# Patient Record
Sex: MALE | Race: WHITE | Employment: UNEMPLOYED | URBAN - METROPOLITAN AREA
[De-identification: names, ages, dates, MRNs, and addresses within clinical notes are randomized per-mention and may not be internally consistent; named-entity substitution may affect disease eponyms.]

---

## 2017-01-02 ENCOUNTER — APPOINTMENT (OUTPATIENT)
Dept: CARDIAC REHAB | Age: 62
End: 2017-01-02
Attending: INTERNAL MEDICINE
Payer: COMMERCIAL

## 2017-01-04 ENCOUNTER — HOSPITAL ENCOUNTER (OUTPATIENT)
Dept: CARDIAC REHAB | Age: 62
Discharge: HOME | End: 2017-01-04
Attending: INTERNAL MEDICINE
Payer: COMMERCIAL

## 2017-01-04 RX ORDER — METFORMIN HYDROCHLORIDE 1000 MG/1
1000 TABLET ORAL 2 TIMES DAILY WITH MEALS
Qty: 60 TAB | Refills: 2 | Status: SHIPPED | OUTPATIENT
Start: 2017-01-04 | End: 2017-05-17 | Stop reason: SDUPTHER

## 2017-01-06 ENCOUNTER — HOSPITAL ENCOUNTER (OUTPATIENT)
Dept: CARDIAC REHAB | Age: 62
Discharge: HOME | End: 2017-01-06
Attending: INTERNAL MEDICINE
Payer: COMMERCIAL

## 2017-01-06 VITALS — BODY MASS INDEX: 26.31 KG/M2 | WEIGHT: 163 LBS

## 2017-01-06 PROCEDURE — 93798 PHYS/QHP OP CAR RHAB W/ECG: CPT

## 2017-01-06 NOTE — CARDIO/PULMONARY
He reported having some 4/10 intermittent lower epigastic area pain. He has had this before and has seen physician about it.

## 2017-01-09 ENCOUNTER — HOSPITAL ENCOUNTER (OUTPATIENT)
Dept: CARDIAC REHAB | Age: 62
Discharge: HOME | End: 2017-01-09
Attending: INTERNAL MEDICINE
Payer: COMMERCIAL

## 2017-01-11 ENCOUNTER — HOSPITAL ENCOUNTER (OUTPATIENT)
Dept: CARDIAC REHAB | Age: 62
Discharge: HOME | End: 2017-01-11
Attending: INTERNAL MEDICINE
Payer: COMMERCIAL

## 2017-01-11 VITALS — WEIGHT: 161 LBS | BODY MASS INDEX: 25.99 KG/M2

## 2017-01-11 PROCEDURE — 93798 PHYS/QHP OP CAR RHAB W/ECG: CPT

## 2017-01-13 ENCOUNTER — HOSPITAL ENCOUNTER (OUTPATIENT)
Dept: CARDIAC REHAB | Age: 62
Discharge: HOME | End: 2017-01-13
Attending: INTERNAL MEDICINE
Payer: COMMERCIAL

## 2017-01-13 VITALS — WEIGHT: 162 LBS | BODY MASS INDEX: 26.15 KG/M2

## 2017-01-13 PROCEDURE — 93798 PHYS/QHP OP CAR RHAB W/ECG: CPT

## 2017-01-16 ENCOUNTER — HOSPITAL ENCOUNTER (OUTPATIENT)
Dept: CARDIAC REHAB | Age: 62
Discharge: HOME | End: 2017-01-16
Attending: INTERNAL MEDICINE
Payer: COMMERCIAL

## 2017-01-16 VITALS — BODY MASS INDEX: 25.99 KG/M2 | WEIGHT: 161 LBS

## 2017-01-16 PROCEDURE — 93798 PHYS/QHP OP CAR RHAB W/ECG: CPT

## 2017-01-18 ENCOUNTER — HOSPITAL ENCOUNTER (OUTPATIENT)
Dept: CARDIAC REHAB | Age: 62
Discharge: HOME | End: 2017-01-18
Attending: INTERNAL MEDICINE
Payer: COMMERCIAL

## 2017-01-18 VITALS — WEIGHT: 161 LBS | BODY MASS INDEX: 25.99 KG/M2

## 2017-01-18 PROCEDURE — 93798 PHYS/QHP OP CAR RHAB W/ECG: CPT

## 2017-01-18 NOTE — CARDIO/PULMONARY
CP REHAB NOTE    Patient arrived for session 15/36. /72  HR 74      Patient is walking 20-30 minutes on days he is not at rehab. He eats 3 fruits and vegetables per day. Encouraged patient to try and get 5 servings per day. Patient is still smoking. Discussed smoking cessation with patient and encouraged him to have a discussion with his doctor about medications to help.

## 2017-01-20 ENCOUNTER — HOSPITAL ENCOUNTER (OUTPATIENT)
Dept: CARDIAC REHAB | Age: 62
Discharge: HOME | End: 2017-01-20
Attending: INTERNAL MEDICINE
Payer: COMMERCIAL

## 2017-01-20 VITALS — WEIGHT: 158 LBS | BODY MASS INDEX: 25.5 KG/M2

## 2017-01-20 PROCEDURE — 93798 PHYS/QHP OP CAR RHAB W/ECG: CPT

## 2017-01-23 ENCOUNTER — HOSPITAL ENCOUNTER (OUTPATIENT)
Dept: CARDIAC REHAB | Age: 62
Discharge: HOME | End: 2017-01-23
Attending: INTERNAL MEDICINE
Payer: COMMERCIAL

## 2017-01-23 VITALS — WEIGHT: 157 LBS | BODY MASS INDEX: 25.34 KG/M2

## 2017-01-23 PROCEDURE — 93798 PHYS/QHP OP CAR RHAB W/ECG: CPT

## 2017-01-23 NOTE — CARDIO/PULMONARY
No med changes. Denies symptoms. Walks for exercise on days he is not in rehab for up to 30 minutes. Is independent with his exercise regimen in Cardiac Rehab. Down to half a pack a day. Has not set a quit date. Has used nicotine patch in the past but it made him \"sick. \"  Encouragement provided.

## 2017-01-25 ENCOUNTER — OFFICE VISIT (OUTPATIENT)
Dept: CARDIOLOGY CLINIC | Age: 62
End: 2017-01-25

## 2017-01-25 ENCOUNTER — HOSPITAL ENCOUNTER (OUTPATIENT)
Dept: CARDIAC REHAB | Age: 62
Discharge: HOME | End: 2017-01-25
Attending: INTERNAL MEDICINE
Payer: COMMERCIAL

## 2017-01-25 ENCOUNTER — CLINICAL SUPPORT (OUTPATIENT)
Dept: CARDIOLOGY CLINIC | Age: 62
End: 2017-01-25

## 2017-01-25 VITALS
SYSTOLIC BLOOD PRESSURE: 100 MMHG | HEIGHT: 66 IN | DIASTOLIC BLOOD PRESSURE: 56 MMHG | RESPIRATION RATE: 18 BRPM | BODY MASS INDEX: 25.23 KG/M2 | OXYGEN SATURATION: 96 % | HEART RATE: 60 BPM | WEIGHT: 157 LBS

## 2017-01-25 DIAGNOSIS — I25.5 ISCHEMIC CARDIOMYOPATHY: ICD-10-CM

## 2017-01-25 DIAGNOSIS — I42.9 CARDIOMYOPATHY (HCC): ICD-10-CM

## 2017-01-25 DIAGNOSIS — F17.200 SMOKER: ICD-10-CM

## 2017-01-25 DIAGNOSIS — I50.22 CHRONIC SYSTOLIC CONGESTIVE HEART FAILURE (HCC): ICD-10-CM

## 2017-01-25 DIAGNOSIS — Z95.5 S/P CORONARY ARTERY STENT PLACEMENT: ICD-10-CM

## 2017-01-25 DIAGNOSIS — E78.2 MIXED HYPERLIPIDEMIA: ICD-10-CM

## 2017-01-25 DIAGNOSIS — E78.2 MIXED HYPERLIPIDEMIA: Primary | ICD-10-CM

## 2017-01-25 PROCEDURE — 93798 PHYS/QHP OP CAR RHAB W/ECG: CPT

## 2017-01-25 NOTE — PROGRESS NOTES
PATIENT'S ID VERIFIED BY TWO IDENTIFIERS. ECHOCARDIOGRAM PROCEDURE EXPLAINED TO PATIENT. PATIENT VERBALIZED UNDERSTANDING OF INSTRUCTIONS.

## 2017-01-25 NOTE — MR AVS SNAPSHOT
Visit Information Date & Time Provider Department Dept. Phone Encounter #  
 1/25/2017 10:45 AM Eduar Elaine, 1024 [facility]  Your Appointments 1/26/2017  9:30 AM  
New Patient with Hardik Mills MD  
1400 [facility]) Appt Note: Per Dr Jacky Escobar ICD $0CP REM  
 [address]  
[phone] [address]  
  
    
 4/26/2017 10:45 AM  
3 MONTH with Eduar Elaine MD  
[facility] Appt Note: Per Dr Jacky Escobar $0CP REM  
 [address]  
[phone] [address] Upcoming Health Maintenance Date Due Hepatitis C Screening 1955 FOOT EXAM Q1 3/30/1963 EYE EXAM RETINAL OR DILATED Q1 3/30/1963 Pneumococcal 19-64 Medium Risk (1 of 1 - PPSV23) 3/30/1972 FOBT Q 1 YEAR AGE 50-75 3/30/2003 ZOSTER VACCINE AGE 60> 3/30/2013 HEMOGLOBIN A1C Q6M 12/20/2016 LIPID PANEL Q1 6/20/2017 MICROALBUMIN Q1 8/16/2017 DTaP/Tdap/Td series (2 - Td) 9/11/2023 Allergies as of 1/25/2017  Review Complete On: 1/25/2017 By: Ten Skinner LPN Severity Noted Reaction Type Reactions Penicillins Low 05/23/2016   Not Verified Other (comments) Doesn't remember reaction Current Immunizations  Reviewed on 11/29/2016 Name Date Influenza Vaccine 10/27/2016 Influenza Vaccine (Quad) PF 10/30/2016  9:39 AM  
 Pneumococcal Conjugate (PCV-13) 6/20/2016 Not reviewed this visit You Were Diagnosed With   
  
 Codes Comments Mixed hyperlipidemia    -  Primary ICD-10-CM: K89.0 ICD-9-CM: 272.2 Vitals BP Pulse Resp Height(growth percentile) Weight(growth percentile) SpO2  
 100/56 (BP 1 Location: Right arm, BP Patient Position: Sitting) 60 18 5' 6\" (1.676 m) 157 lb (71.2 kg) 96% BMI Smoking Status 25.34 kg/m2 Current Every Day Smoker Vitals History BMI and BSA Data Body Mass Index Body Surface Area  
 25.34 kg/m 2 1.82 m 2 Preferred Pharmacy Pharmacy Name Phone [facility], [address] [place] [address] [phone] Your Updated Medication List  
  
   
This list is accurate as of: 1/25/17 12:03 PM.  Always use your most recent med list.  
  
  
  
  
 aspirin delayed-release 81 mg tablet Take 1 Tab by mouth daily. atorvastatin 20 mg tablet Commonly known as:  LIPITOR Take 1 Tab by mouth daily. Blood-Glucose Meter monitoring kit Check every day. Brand per patient's choice  
  
 carvedilol 6.25 mg tablet Commonly known as:  Josue Marshal Take 1 Tab by mouth two (2) times daily (with meals). glucose blood VI test strips strip Commonly known as:  ASCENSIA AUTODISC VI, ONE TOUCH ULTRA TEST VI Check every day. Brand per patient's choice * Lancets Misc Check QD  
  
 * ONETOUCH DELICA LANCETS 30 gauge Misc Generic drug:  lancets USE DAILY TO CHECK BLOOD SUGAR  
  
 levothyroxine 88 mcg tablet Commonly known as:  SYNTHROID Take 1 Tab by mouth Daily (before breakfast). lisinopril 2.5 mg tablet Commonly known as:  Crystal Reasons Take 1 Tab by mouth daily. metFORMIN 1,000 mg tablet Commonly known as:  GLUCOPHAGE Take 1 Tab by mouth two (2) times daily (with meals). pantoprazole 40 mg tablet Commonly known as:  PROTONIX Take 1 Tab by mouth two (2) times a day. ticagrelor 90 mg tablet Commonly known as:  [employer] Take 1 Tab by mouth every twelve (12) hours every twelve (12) hours. * Notice: This list has 2 medication(s) that are the same as other medications prescribed for you. Read the directions carefully, and ask your doctor or other care provider to review them with you. We Performed the Following AMB POC EKG ROUTINE W/ 12 LEADS, INTER & REP [28977 CPT(R)] To-Do List   
 01/25/2017 1:00 PM  
  Appointment with MRM CARDIOPULM PHASE II at [facility] ([phone])  
  
 01/27/2017 1:00 PM  
  Appointment with MRM CARDIOPULM PHASE II at [facility] ([phone])  
  
 01/30/2017 1:00 PM  
  Appointment with MRM CARDIOPULM PHASE II at [facility] ([phone])  
  
 02/01/2017 1:00 PM  
  Appointment with MRM CARDIOPULM PHASE II at [facility] ([phone])  
  
 02/03/2017 1:00 PM  
  Appointment with MRM CARDIOPULM PHASE II at [facility] ([phone])  
  
 02/06/2017 1:00 PM  
  Appointment with MRM CARDIOPULM PHASE II at [facility] ([phone])  
  
 02/08/2017 1:00 PM  
  Appointment with MRM CARDIOPULM PHASE II at [facility] ([phone])  
  
 02/10/2017 1:00 PM  
  Appointment with MRM CARDIOPULM PHASE II at [facility] ([phone])  
  
 02/13/2017 1:00 PM  
  Appointment with MRM CARDIOPULM PHASE II at [facility] ([phone])  
  
 02/15/2017 1:00 PM  
  Appointment with MRM CARDIOPULM PHASE II at [facility] ([phone])  
  
 02/17/2017 1:00 PM  
  Appointment with MRM CARDIOPULM PHASE II at [facility] ([phone])  
  
 02/20/2017 1:00 PM  
  Appointment with MRM CARDIOPULM PHASE II at [facility] ([phone])  
  
 02/22/2017 1:00 PM  
  Appointment with MRM CARDIOPULM PHASE II at [facility] ([phone])  
  
 02/24/2017 1:00 PM  
  Appointment with MRM CARDIOPULM PHASE II at [facility] ([phone])  
  
 02/27/2017 1:00 PM  
  Appointment with MRM CARDIOPULM PHASE II at [facility] ([phone])  
  
 03/01/2017 1:00 PM  
  Appointment with MRM CARDIOPULM PHASE II at [facility] ([phone]) [facility] SERVICES! Dear Get Jarvis: Thank you for requesting a [employer] account.   Our records indicate that you already have an active [employer] account. You can access your account anytime at [email or web address] Did you know that you can access your hospital and ER discharge instructions at any time in [employer]? You can also review all of your test results from your hospital stay or ER visit. Additional Information If you have questions, please visit the Frequently Asked Questions section of the [employer] website at [email or web address] Remember, [employer] is NOT to be used for urgent needs. For medical emergencies, dial 911. Now available from your iPhone and Android! Please provide this summary of care documentation to your next provider. Your primary care clinician is listed as Anna Barakat. If you have any questions after today's visit, please call [phone].

## 2017-01-25 NOTE — PROGRESS NOTES
Serena Elias MD          NAME:  Kedar Jacobsen   :   1955   MRN:   [id number]   PCP:  Noé Naidu MD           Subjective: The patient is a 59y.o. year old male  who returns for a routine follow-up. Since the last visit, patient reports no change in exercise tolerance, chest pain, edema, medication intolerance, palpitations, shortness of breath, PND/orthopnea wheezing, sputum, syncope, dizziness or light headedness. Doing well. Past Medical History   Diagnosis Date    Acquired hypothyroidism 2016    Acute kidney injury ([facility] 10/26/2016    Adhesive capsulitis of right shoulder 2016    Chronic systolic congestive heart failure ([facility] 2016    Chronic systolic congestive heart failure ([facility] 2016    Diabetes type 2, uncontrolled ([facility] 2016    Esophageal ulceration 10/26/2016     10/25/16 2 hemoclips placed    Gastroesophageal reflux disease without esophagitis 2016    GIB (gastrointestinal bleeding) 10/26/2016    Mixed hyperlipidemia 2016    S/P coronary artery stent placement 10/25/2016     10/24/16 PCI/EDUARDO x6 to prox LCx, [id number] RCA        ICD-10-CM ICD-9-CM    1. Mixed hyperlipidemia E78.2 272.2 AMB POC EKG ROUTINE W/ 12 LEADS, INTER & REP   2. Chronic systolic congestive heart failure (HCC) I50.22 428.22      428.0    3. Cardiomyopathy ([facility] I42.9 425.4    4. Ischemic cardiomyopathy I25.5 414.8    5.  S/P coronary artery stent placement Z95.5 V45.82       Social History   Substance Use Topics    Smoking status: Current Every Day Smoker     Packs/day: 1.00     Years: 45.00    Smokeless tobacco: Never Used      Comment: down to 1/2 pack daily 16    Alcohol use No      Family History   Problem Relation Age of Onset    Heart Disease Father      cabg x2, valve repair    Hypertension Father     Diabetes Brother     Diabetes Maternal Grandmother         Review of Systems  Cardiovascular: Negative except as noted in HPI      Objective: Vitals:    01/25/17 1106 01/25/17 1113   BP: 100/50 100/56   Pulse: 60    Resp: 18    SpO2: 96%    Weight: 157 lb (71.2 kg)    Height: 5' 6\" (1.676 m)     Body mass index is 25.34 kg/(m^2). General PE  Mental Status - Alert. General Appearance - Not in acute distress. Chest and Lung Exam   Inspection: Accessory muscles - No use of accessory muscles in breathing. Auscultation:   Breath sounds: - Normal.    Cardiovascular   Inspection: Jugular vein - Bilateral - Inspection Normal.  Palpation/Percussion:   Apical Impulse: - Normal.  Auscultation: Rhythm - Regular. Heart Sounds - S1 WNL and S2 WNL. No S3 or S4. Murmurs & Other Heart Sounds: Auscultation of the heart reveals - No Murmurs. Peripheral Vascular   Upper Extremity: Inspection - Bilateral - No Cyanotic nailbeds or Digital clubbing. Lower Extremity:   Palpation: Edema - Bilateral - No edema. Data Review:     EKG -  EKG: unchanged from previous tracings, normal sinus rhythm, nonspecific ST and T waves changes. LABS- @brieflabs@      Allergies reviewed  Allergies   Allergen Reactions    Penicillins Other (comments)     Doesn't remember reaction       Medications reviewed  Current Outpatient Prescriptions   Medication Sig    metFORMIN (GLUCOPHAGE) 1,000 mg tablet Take 1 Tab by mouth two (2) times daily (with meals).  lisinopril (PRINIVIL, ZESTRIL) 2.5 mg tablet Take 1 Tab by mouth daily.  carvedilol (COREG) 6.25 mg tablet Take 1 Tab by mouth two (2) times daily (with meals).  ticagrelor (BRILINTA) 90 mg tablet Take 1 Tab by mouth every twelve (12) hours every twelve (12) hours.  pantoprazole (PROTONIX) 40 mg tablet Take 1 Tab by mouth two (2) times a day.  levothyroxine (SYNTHROID) 88 mcg tablet Take 1 Tab by mouth Daily (before breakfast).  atorvastatin (LIPITOR) 20 mg tablet Take 1 Tab by mouth daily.     ONETOUCH DELICA LANCETS 30 gauge misc USE DAILY TO CHECK BLOOD SUGAR    aspirin delayed-release 81 mg tablet Take 1 Tab by mouth daily.  Blood-Glucose Meter monitoring kit Check every day. Brand per patient's choice    glucose blood VI test strips (ASCENSIA AUTODISC VI, ONE TOUCH ULTRA TEST VI) strip Check every day. Brand per patient's choice    Lancets misc Check QD     No current facility-administered medications for this visit. Assessment:       ICD-10-CM ICD-9-CM    1. Mixed hyperlipidemia E78.2 272.2 AMB POC EKG ROUTINE W/ 12 LEADS, INTER & REP   2. Chronic systolic congestive heart failure (HCC) I50.22 428.22      428.0    3. Cardiomyopathy ([facility] I42.9 425.4    4. Ischemic cardiomyopathy I25.5 414.8    5. S/P coronary artery stent placement Z95.5 V45.82         Orders Placed This Encounter    AMB POC EKG ROUTINE W/ 12 LEADS, INTER & REP     Order Specific Question:   Reason for Exam:     Answer:   routine       Plan:     EF 35% 90 d post multivessel stenting. Rec ICD. Refer to EP.   F/U 3 mo    Julienne Corrigan MD

## 2017-01-25 NOTE — PROGRESS NOTES
Chief Complaint   Patient presents with    Heart Problem     Had echo today. Denied cardiac symptoms.

## 2017-01-26 ENCOUNTER — OFFICE VISIT (OUTPATIENT)
Dept: CARDIOLOGY CLINIC | Age: 62
End: 2017-01-26

## 2017-01-26 VITALS
DIASTOLIC BLOOD PRESSURE: 60 MMHG | OXYGEN SATURATION: 98 % | WEIGHT: 159.13 LBS | HEART RATE: 69 BPM | HEIGHT: 66 IN | RESPIRATION RATE: 16 BRPM | SYSTOLIC BLOOD PRESSURE: 118 MMHG | BODY MASS INDEX: 25.57 KG/M2

## 2017-01-26 DIAGNOSIS — E78.2 MIXED HYPERLIPIDEMIA: Primary | ICD-10-CM

## 2017-01-26 DIAGNOSIS — I25.5 ISCHEMIC CARDIOMYOPATHY: Primary | ICD-10-CM

## 2017-01-26 DIAGNOSIS — I25.5 ISCHEMIC CARDIOMYOPATHY: ICD-10-CM

## 2017-01-26 DIAGNOSIS — I50.22 CHRONIC SYSTOLIC CONGESTIVE HEART FAILURE (HCC): ICD-10-CM

## 2017-01-26 NOTE — MR AVS SNAPSHOT
Visit Information Date & Time Provider Department Dept. Phone Encounter #  
 1/26/2017  9:30 AM Stephanie Aguirre, 1024 [facility] [phone] [id number] Your Appointments 4/26/2017 10:45 AM  
3 MONTH with Rohan Serrano MD  
[address] [facility] [address] [facility]) Appt Note: Per Dr Adali hCen $0CP REM  
 [facility]  
[phone] [facility] Upcoming Health Maintenance Date Due Hepatitis C Screening 1955 FOOT EXAM Q1 3/30/1963 EYE EXAM RETINAL OR DILATED Q1 3/30/1963 Pneumococcal 19-64 Medium Risk (1 of 1 - PPSV23) 3/30/1972 FOBT Q 1 YEAR AGE 50-75 3/30/2003 ZOSTER VACCINE AGE 60> 3/30/2013 HEMOGLOBIN A1C Q6M 12/20/2016 LIPID PANEL Q1 6/20/2017 MICROALBUMIN Q1 8/16/2017 DTaP/Tdap/Td series (2 - Td) 9/11/2023 Allergies as of 1/26/2017  Review Complete On: 1/26/2017 By: Anna Marie Gordon LPN Severity Noted Reaction Type Reactions Penicillins Low 05/23/2016   Not Verified Other (comments) Doesn't remember reaction Current Immunizations  Reviewed on 11/29/2016 Name Date Influenza Vaccine 10/27/2016 Influenza Vaccine (Quad) PF 10/30/2016  9:39 AM  
 Pneumococcal Conjugate (PCV-13) 6/20/2016 Not reviewed this visit You Were Diagnosed With   
  
 Codes Comments Mixed hyperlipidemia    -  Primary ICD-10-CM: P59.8 ICD-9-CM: 272.2 Vitals BP Pulse Resp Height(growth percentile) Weight(growth percentile) SpO2  
 118/60 (BP 1 Location: Left arm, BP Patient Position: Sitting) 69 16 5' 6\" (1.676 m) 159 lb 2 oz (72.2 kg) 98% BMI Smoking Status 25.68 kg/m2 Current Every Day Smoker Vitals History BMI and BSA Data Body Mass Index Body Surface Area  
 25.68 kg/m 2 1.83 m 2 Preferred Pharmacy [facility] [address] [place] [address] Lilly Oshea, [address] [place] [address] [phone] Your Updated Medication List  
  
   
This list is accurate as of: 1/26/17 10:12 AM.  Always use your most recent med list.  
  
  
  
  
 aspirin delayed-release 81 mg tablet Take 1 Tab by mouth daily. atorvastatin 20 mg tablet Commonly known as:  LIPITOR Take 1 Tab by mouth daily. Blood-Glucose Meter monitoring kit Check every day. Brand per patient's choice  
  
 carvedilol 6.25 mg tablet Commonly known as:  Marleny Locket Take 1 Tab by mouth two (2) times daily (with meals). glucose blood VI test strips strip Commonly known as:  ASCENSIA AUTODISC VI, ONE TOUCH ULTRA TEST VI Check every day. Brand per patient's choice * Lancets Misc Check QD  
  
 * ONETOUCH DELICA LANCETS 30 gauge Misc Generic drug:  lancets USE DAILY TO CHECK BLOOD SUGAR  
  
 levothyroxine 88 mcg tablet Commonly known as:  SYNTHROID Take 1 Tab by mouth Daily (before breakfast). lisinopril 2.5 mg tablet Commonly known as:  Marnie Trevor Take 1 Tab by mouth daily. metFORMIN 1,000 mg tablet Commonly known as:  GLUCOPHAGE Take 1 Tab by mouth two (2) times daily (with meals). pantoprazole 40 mg tablet Commonly known as:  PROTONIX Take 1 Tab by mouth two (2) times a day. ticagrelor 90 mg tablet Commonly known as:  [employer] Take 1 Tab by mouth every twelve (12) hours every twelve (12) hours. * Notice: This list has 2 medication(s) that are the same as other medications prescribed for you. Read the directions carefully, and ask your doctor or other care provider to review them with you. We Performed the Following AMB POC EKG ROUTINE W/ 12 LEADS, INTER & REP [76241 CPT(R)] To-Do List   
 01/27/2017 1:00 PM  
  Appointment with KOFFI CARDIOSARAH PHASE II at [facility] ([phone])  
  
 01/30/2017 1:00 PM  
 Appointment with MRM CARDIOPULM PHASE II at [facility] ([phone])  
  
 02/01/2017 1:00 PM  
  Appointment with MRM CARDIOPULM PHASE II at [facility] ([phone])  
  
 02/03/2017 1:00 PM  
  Appointment with MRM CARDIOPULM PHASE II at [facility] ([phone])  
  
 02/06/2017 1:00 PM  
  Appointment with MRM CARDIOPULM PHASE II at [facility] ([phone])  
  
 02/08/2017 1:00 PM  
  Appointment with MRM CARDIOPULM PHASE II at [facility] ([phone])  
  
 02/10/2017 1:00 PM  
  Appointment with MRM CARDIOPULM PHASE II at [facility] ([phone])  
  
 02/13/2017 1:00 PM  
  Appointment with MRM CARDIOPULM PHASE II at [facility] ([phone])  
  
 02/15/2017 1:00 PM  
  Appointment with MRM CARDIOPULM PHASE II at [facility] ([phone])  
  
 02/17/2017 1:00 PM  
  Appointment with MRM CARDIOPULM PHASE II at [facility] ([phone])  
  
 02/20/2017 1:00 PM  
  Appointment with MRM CARDIOPULM PHASE II at [facility] ([phone])  
  
 02/22/2017 1:00 PM  
  Appointment with MRM CARDIOPULM PHASE II at [facility] ([phone])  
  
 02/24/2017 1:00 PM  
  Appointment with MRM CARDIOPULM PHASE II at [facility] ([phone])  
  
 02/27/2017 1:00 PM  
  Appointment with MRM CARDIOPULM PHASE II at [facility] ([phone])  
  
 03/01/2017 1:00 PM  
  Appointment with MRM CARDIOPULM PHASE II at [facility] ([phone]) [facility] SERVICES! Dear Megha Dawson: Thank you for requesting a [employer] account. Our records indicate that you already have an active [employer] account. You can access your account anytime at [email or web address] Did you know that you can access your hospital and ER discharge instructions at any time in [employer]? You can also review all of your test results from your hospital stay or ER visit. Additional Information If you have questions, please visit the Frequently Asked Questions section of the [employer] website at [email or web address] Remember, [employer] is NOT to be used for urgent needs. For medical emergencies, dial 911. Now available from your iPhone and Android! Please provide this summary of care documentation to your next provider. Your primary care clinician is listed as Falguni Boland. If you have any questions after today's visit, please call [phone].

## 2017-01-26 NOTE — PROGRESS NOTES
Subjective:      Meg Browning is a 59 y.o. male is here for EP consult. He had a pci last year and repeat echo post 3 months of med rx demonstrated cardiomyopathy. He has sob with exertion.      Patient Active Problem List    Diagnosis Date Noted    Ischemic cardiomyopathy 11/11/2016    Esophageal ulceration 10/26/2016    GIB (gastrointestinal bleeding) 10/26/2016    Acute kidney injury ([address] 10/26/2016    S/P coronary artery stent placement 10/25/2016    Unstable angina ([address] 10/24/2016    Angina, class III ([address] 10/24/2016    Chronic systolic congestive heart failure ([address] 08/30/2016    GUILLEN (dyspnea on exertion) 08/24/2016    Acquired hypothyroidism 07/11/2016    Mixed hyperlipidemia 07/11/2016    Diabetes type 2, uncontrolled ([address] 07/11/2016    Smoker 05/23/2016    Adhesive capsulitis of right shoulder 05/23/2016    Gastroesophageal reflux disease without esophagitis 05/23/2016      Coral Cervantes MD  Past Medical History   Diagnosis Date    Acquired hypothyroidism 7/11/2016    Acute kidney injury ([address] 10/26/2016    Adhesive capsulitis of right shoulder 5/23/2016    Chronic systolic congestive heart failure ([address] 8/30/2016    Chronic systolic congestive heart failure ([address] 8/30/2016    Diabetes type 2, uncontrolled ([address] 7/11/2016    Esophageal ulceration 10/26/2016     10/25/16 2 hemoclips placed    Gastroesophageal reflux disease without esophagitis 5/23/2016    GIB (gastrointestinal bleeding) 10/26/2016    Mixed hyperlipidemia 7/11/2016    S/P coronary artery stent placement 10/25/2016     10/24/16 PCI/EDUARDO x6 to prox LCx, [id number] RCA      Past Surgical History   Procedure Laterality Date    Pr abdomen surgery proc unlisted       hernia repair right    Upper gi endoscopy,ctrl bleed  10/25/2016          Upper gi endoscopy,biopsy  10/25/2016           Allergies   Allergen Reactions    Penicillins Other (comments)     Doesn't remember reaction      Family History Problem Relation Age of Onset    Heart Disease Father      cabg x2, valve repair    Hypertension Father     Diabetes Brother     Diabetes Maternal Grandmother     negative for cardiac disease  Social History     Social History    Marital status: SINGLE     Spouse name: N/A    Number of children: N/A    Years of education: N/A     Social History Main Topics    Smoking status: Current Every Day Smoker     Packs/day: 1.00     Years: 45.00    Smokeless tobacco: Never Used      Comment: down to 1/2 pack daily 11/29/16    Alcohol use No    Drug use: No    Sexual activity: No     Other Topics Concern    Not on file     Social History Narrative     Current Outpatient Prescriptions   Medication Sig    metFORMIN (GLUCOPHAGE) 1,000 mg tablet Take 1 Tab by mouth two (2) times daily (with meals).  lisinopril (PRINIVIL, ZESTRIL) 2.5 mg tablet Take 1 Tab by mouth daily.  carvedilol (COREG) 6.25 mg tablet Take 1 Tab by mouth two (2) times daily (with meals).  ticagrelor (BRILINTA) 90 mg tablet Take 1 Tab by mouth every twelve (12) hours every twelve (12) hours.  pantoprazole (PROTONIX) 40 mg tablet Take 1 Tab by mouth two (2) times a day.  levothyroxine (SYNTHROID) 88 mcg tablet Take 1 Tab by mouth Daily (before breakfast).  atorvastatin (LIPITOR) 20 mg tablet Take 1 Tab by mouth daily.  ONETOUCH DELICA LANCETS 30 gauge misc USE DAILY TO CHECK BLOOD SUGAR    aspirin delayed-release 81 mg tablet Take 1 Tab by mouth daily.  Blood-Glucose Meter monitoring kit Check every day. Brand per patient's choice    glucose blood VI test strips (ASCENSIA AUTODISC VI, ONE TOUCH ULTRA TEST VI) strip Check every day. Brand per patient's choice    Lancets misc Check QD     No current facility-administered medications for this visit.        Vitals:    01/26/17 0939   BP: 118/60   Pulse: 69   Resp: 16   SpO2: 98%   Weight: 159 lb 2 oz (72.2 kg)   Height: 5' 6\" (1.676 m)       I have reviewed the nurses notes, vitals, problem list, allergy list, medical history, family, social history and medications. Review of Symptoms:    General: Pt denies excessive weight gain or loss. Pt is able to conduct ADL's  HEENT: Denies blurred vision, headaches, epistaxis and difficulty swallowing. Respiratory: Denies shortness of breath, GUILLEN, wheezing or stridor. Cardiovascular: Denies precordial pain, palpitations, edema or PND  Gastrointestinal: Denies poor appetite, indigestion, abdominal pain or blood in stool  Urinary: Denies dysuria, pyuria  Musculoskeletal: Denies pain or swelling from muscles or joints  Neurologic: Denies tremor, paresthesias, or sensory motor disturbance  Skin: Denies rash, itching or texture change. Psych: Denies depression      Physical Exam:      General: Well developed, in no acute distress. HEENT: Eyes - PERRL, no jvd  Heart:  Normal S1/S2 negative S3 or S4. Regular, no murmur, gallop or rub.   Respiratory: Clear bilaterally x 4, no wheezing or rales  Abdomen:   Soft, non-tender, bowel sounds are active.   Extremities:  No edema, normal cap refill, no cyanosis. Musculoskeletal: No clubbing  Neuro: A&Ox3, speech clear, gait stable. Skin: Skin color is normal. No rashes or lesions.  Non diaphoretic  Vascular: 2+ pulses symmetric in all extremities    Cardiographics    Ekg: nsr    Echo - lvef 30    Results for orders placed or performed during the hospital encounter of 10/24/16   EKG, 12 LEAD, INITIAL   Result Value Ref Range    Ventricular Rate 87 BPM    Atrial Rate 87 BPM    P-R Interval 166 ms    QRS Duration 122 ms    Q-T Interval 380 ms    QTC Calculation (Bezet) 457 ms    Calculated P Axis 63 degrees    Calculated R Axis -3 degrees    Calculated T Axis 119 degrees    Diagnosis       Normal sinus rhythm  Possible Left atrial enlargement    Cannot rule out Inferior injury pattern  Confirmed by Dea Blum ([id number]) on 10/26/2016 8:07:09 AM           Lab Results   Component Value Date/Time WBC 6.5 10/30/2016 05:24 AM    HGB 9.6 10/30/2016 05:24 AM    HCT 28.4 10/30/2016 05:24 AM    PLATELET 388 66/14/7783 05:24 AM    MCV 88.8 10/30/2016 05:24 AM      Lab Results   Component Value Date/Time    Sodium 141 10/29/2016 05:30 AM    Potassium 3.5 10/29/2016 05:30 AM    Chloride 107 10/29/2016 05:30 AM    CO2 26 10/29/2016 05:30 AM    Anion gap 8 10/29/2016 05:30 AM    Glucose 116 10/29/2016 05:30 AM    BUN 14 10/29/2016 05:30 AM    Creatinine 0.80 10/29/2016 05:30 AM    BUN/Creatinine ratio 18 10/29/2016 05:30 AM    GFR est AA >60 10/29/2016 05:30 AM    GFR est non-AA >60 10/29/2016 05:30 AM    Calcium 8.4 10/29/2016 05:30 AM    Bilirubin, total 0.9 10/28/2016 04:31 AM    ALT 19 10/28/2016 04:31 AM    AST 47 10/28/2016 04:31 AM    Alk. phosphatase 53 10/28/2016 04:31 AM    Protein, total 6.5 10/28/2016 04:31 AM    Albumin 2.9 10/28/2016 04:31 AM    Globulin 3.6 10/28/2016 04:31 AM    A-G Ratio 0.8 10/28/2016 04:31 AM         Assessment:     Assessment:        ICD-10-CM ICD-9-CM    1. Mixed hyperlipidemia E78.2 272.2 AMB POC EKG ROUTINE W/ 12 LEADS, INTER & REP   2. Ischemic cardiomyopathy I25.5 414.8    3. Chronic systolic congestive heart failure (HCC) I50.22 428.22      428.0      Orders Placed This Encounter    AMB POC EKG ROUTINE W/ 12 LEADS, INTER & REP     Order Specific Question:   Reason for Exam:     Answer:   ROUTINE        Plan:   Mr Irena Francis is a pleasant gentleman with an ischemic cardiomyopathy (EF 30%) and class II/III CHF. He is a candidate for an ICD. I discussed the risks/benefits/alternatives of the procedure with the patient. Risks include (but are not limited to) bleeding, heart block, infection, cva/mi/tamponade/death. The patient understands and agrees to proceed. Thank you for this interesting consultation. Continue medical management for cad. Thank you for allowing me to participate in Richard Alleyjuan jose 's care.     Jasen Joiner MD, Kumar Bryant

## 2017-01-27 ENCOUNTER — HOSPITAL ENCOUNTER (OUTPATIENT)
Dept: CARDIAC REHAB | Age: 62
Discharge: HOME | End: 2017-01-27
Attending: INTERNAL MEDICINE
Payer: COMMERCIAL

## 2017-01-27 PROCEDURE — 93798 PHYS/QHP OP CAR RHAB W/ECG: CPT

## 2017-01-30 ENCOUNTER — HOSPITAL ENCOUNTER (OUTPATIENT)
Dept: CARDIAC REHAB | Age: 62
Discharge: HOME | End: 2017-01-30
Attending: INTERNAL MEDICINE
Payer: COMMERCIAL

## 2017-01-30 VITALS — WEIGHT: 158 LBS | BODY MASS INDEX: 25.5 KG/M2

## 2017-01-30 PROCEDURE — 93798 PHYS/QHP OP CAR RHAB W/ECG: CPT

## 2017-01-30 NOTE — CARDIO/PULMONARY
Pt states that he is to be scheduled for a ICD next month due to history of ischemic cardiomyopathy (EF 30%) and class II/III CHF. Denies any other medication changes. He was advised to inform us when he knows the date for procedure. He is walking for 30 minutes on non rehab days. He is still smoking a 1/2 ppd. He plans on setting up a quit date after pacemaker implant. He has tried adjunctive therapy without success. He lives alone, but has a mother and father who live in [place], [place] and were here with him when he was hospitalized.

## 2017-02-01 ENCOUNTER — HOSPITAL ENCOUNTER (OUTPATIENT)
Dept: CARDIAC REHAB | Age: 62
Discharge: HOME | End: 2017-02-01
Attending: INTERNAL MEDICINE
Payer: COMMERCIAL

## 2017-02-01 VITALS — BODY MASS INDEX: 25.66 KG/M2 | WEIGHT: 159 LBS

## 2017-02-01 PROCEDURE — 93798 PHYS/QHP OP CAR RHAB W/ECG: CPT

## 2017-02-01 NOTE — CARDIO/PULMONARY
Sarah Cindy Blanca will be out from 2/20/17 through 3/5/17 for surgery (Defib/Pacemaker). Plans to return on 3/6/17.

## 2017-02-03 ENCOUNTER — HOSPITAL ENCOUNTER (OUTPATIENT)
Dept: CARDIAC REHAB | Age: 62
Discharge: HOME | End: 2017-02-03
Attending: INTERNAL MEDICINE
Payer: COMMERCIAL

## 2017-02-03 VITALS — BODY MASS INDEX: 25.66 KG/M2 | WEIGHT: 159 LBS

## 2017-02-03 PROCEDURE — 93798 PHYS/QHP OP CAR RHAB W/ECG: CPT

## 2017-02-06 ENCOUNTER — HOSPITAL ENCOUNTER (OUTPATIENT)
Dept: CARDIAC REHAB | Age: 62
Discharge: HOME | End: 2017-02-06
Attending: INTERNAL MEDICINE
Payer: COMMERCIAL

## 2017-02-06 VITALS — BODY MASS INDEX: 25.82 KG/M2 | WEIGHT: 160 LBS

## 2017-02-06 PROCEDURE — 93798 PHYS/QHP OP CAR RHAB W/ECG: CPT

## 2017-02-06 NOTE — CARDIO/PULMONARY
Pt reports he is walking 45 minutes per day. He is trying to follow a low NA diet. He states he does not have any close family or friends. He states,\" no medication changes. \"

## 2017-02-08 ENCOUNTER — HOSPITAL ENCOUNTER (OUTPATIENT)
Dept: CARDIAC REHAB | Age: 62
Discharge: HOME | End: 2017-02-08
Attending: INTERNAL MEDICINE
Payer: COMMERCIAL

## 2017-02-08 VITALS — WEIGHT: 158 LBS | BODY MASS INDEX: 25.5 KG/M2

## 2017-02-08 PROCEDURE — 93798 PHYS/QHP OP CAR RHAB W/ECG: CPT

## 2017-02-10 ENCOUNTER — HOSPITAL ENCOUNTER (OUTPATIENT)
Dept: CARDIAC REHAB | Age: 62
Discharge: HOME | End: 2017-02-10
Attending: INTERNAL MEDICINE
Payer: COMMERCIAL

## 2017-02-10 VITALS — WEIGHT: 158 LBS | BODY MASS INDEX: 25.5 KG/M2

## 2017-02-10 PROCEDURE — 93798 PHYS/QHP OP CAR RHAB W/ECG: CPT

## 2017-02-13 ENCOUNTER — HOSPITAL ENCOUNTER (OUTPATIENT)
Dept: CARDIAC REHAB | Age: 62
Discharge: HOME | End: 2017-02-13
Attending: INTERNAL MEDICINE
Payer: COMMERCIAL

## 2017-02-13 VITALS — WEIGHT: 160 LBS | BODY MASS INDEX: 25.82 KG/M2

## 2017-02-13 PROCEDURE — 93798 PHYS/QHP OP CAR RHAB W/ECG: CPT

## 2017-02-13 NOTE — CARDIO/PULMONARY
Pt reports he is walking 45 minutes per day. He is trying to follow a low NA diet he is eating only two servings of fruits and vegetables but hopes to increase intake. He states he does not have any close family or friends. He states,\" no medication changes. \"   Pt is scheduled for ICD implant on Monday.

## 2017-02-15 ENCOUNTER — HOSPITAL ENCOUNTER (OUTPATIENT)
Dept: CARDIAC REHAB | Age: 62
Discharge: HOME | End: 2017-02-15
Attending: INTERNAL MEDICINE
Payer: COMMERCIAL

## 2017-02-15 ENCOUNTER — HOSPITAL ENCOUNTER (OUTPATIENT)
Dept: GENERAL RADIOLOGY | Age: 62
Discharge: HOME | End: 2017-02-15
Payer: COMMERCIAL

## 2017-02-15 VITALS — BODY MASS INDEX: 25.82 KG/M2 | WEIGHT: 160 LBS

## 2017-02-15 DIAGNOSIS — I25.5 ISCHEMIC CARDIOMYOPATHY: ICD-10-CM

## 2017-02-15 PROCEDURE — 71020 XR CHEST PA LAT: CPT

## 2017-02-15 PROCEDURE — 93798 PHYS/QHP OP CAR RHAB W/ECG: CPT

## 2017-02-16 LAB
ALBUMIN SERPL-MCNC: 4.1 G/DL (ref 3.6–4.8)
ALBUMIN/GLOB SERPL: 1.5 {RATIO} (ref 1.1–2.5)
ALP SERPL-CCNC: 65 IU/L (ref 39–117)
ALT SERPL-CCNC: 13 IU/L (ref 0–44)
AST SERPL-CCNC: 14 IU/L (ref 0–40)
BASOPHILS # BLD AUTO: 0.1 X10E3/UL (ref 0–0.2)
BASOPHILS NFR BLD AUTO: 1 %
BILIRUB SERPL-MCNC: 0.5 MG/DL (ref 0–1.2)
BUN SERPL-MCNC: 7 MG/DL (ref 8–27)
BUN/CREAT SERPL: 10 (ref 10–22)
CALCIUM SERPL-MCNC: 8.8 MG/DL (ref 8.6–10.2)
CHLORIDE SERPL-SCNC: 100 MMOL/L (ref 96–106)
CO2 SERPL-SCNC: 27 MMOL/L (ref 18–29)
CREAT SERPL-MCNC: 0.67 MG/DL (ref 0.76–1.27)
EOSINOPHIL # BLD AUTO: 0.4 X10E3/UL (ref 0–0.4)
EOSINOPHIL NFR BLD AUTO: 9 %
ERYTHROCYTE [DISTWIDTH] IN BLOOD BY AUTOMATED COUNT: 15 % (ref 12.3–15.4)
GLOBULIN SER CALC-MCNC: 2.7 G/DL (ref 1.5–4.5)
GLUCOSE SERPL-MCNC: 139 MG/DL (ref 65–99)
HCT VFR BLD AUTO: 37.2 % (ref 37.5–51)
HGB BLD-MCNC: 12.1 G/DL (ref 12.6–17.7)
IMM GRANULOCYTES # BLD: 0 X10E3/UL (ref 0–0.1)
IMM GRANULOCYTES NFR BLD: 0 %
INR PPP: 1.1 (ref 0.8–1.2)
LYMPHOCYTES # BLD AUTO: 1.4 X10E3/UL (ref 0.7–3.1)
LYMPHOCYTES NFR BLD AUTO: 33 %
MAGNESIUM SERPL-MCNC: 1 MG/DL (ref 1.6–2.3)
MCH RBC QN AUTO: 29.4 PG (ref 26.6–33)
MCHC RBC AUTO-ENTMCNC: 32.5 G/DL (ref 31.5–35.7)
MCV RBC AUTO: 91 FL (ref 79–97)
MONOCYTES # BLD AUTO: 0.6 X10E3/UL (ref 0.1–0.9)
MONOCYTES NFR BLD AUTO: 13 %
NEUTROPHILS # BLD AUTO: 1.9 X10E3/UL (ref 1.4–7)
NEUTROPHILS NFR BLD AUTO: 44 %
PLATELET # BLD AUTO: 209 X10E3/UL (ref 150–379)
POTASSIUM SERPL-SCNC: 3.9 MMOL/L (ref 3.5–5.2)
PROT SERPL-MCNC: 6.8 G/DL (ref 6–8.5)
PROTHROMBIN TIME: 11.5 SEC (ref 9.1–12)
RBC # BLD AUTO: 4.11 X10E6/UL (ref 4.14–5.8)
SODIUM SERPL-SCNC: 141 MMOL/L (ref 134–144)
WBC # BLD AUTO: 4.2 X10E3/UL (ref 3.4–10.8)

## 2017-02-17 ENCOUNTER — HOSPITAL ENCOUNTER (OUTPATIENT)
Dept: CARDIAC REHAB | Age: 62
Discharge: HOME | End: 2017-02-17
Attending: INTERNAL MEDICINE
Payer: COMMERCIAL

## 2017-02-17 VITALS — WEIGHT: 161 LBS | BODY MASS INDEX: 25.99 KG/M2

## 2017-02-17 PROCEDURE — 93798 PHYS/QHP OP CAR RHAB W/ECG: CPT

## 2017-02-20 ENCOUNTER — ANESTHESIA EVENT (OUTPATIENT)
Dept: MEDSURG UNIT | Age: 62
End: 2017-02-20
Payer: COMMERCIAL

## 2017-02-20 ENCOUNTER — ANESTHESIA (OUTPATIENT)
Dept: MEDSURG UNIT | Age: 62
End: 2017-02-20
Payer: COMMERCIAL

## 2017-02-20 ENCOUNTER — HOSPITAL ENCOUNTER (OUTPATIENT)
Dept: NON INVASIVE DIAGNOSTICS | Age: 62
Discharge: HOME | End: 2017-02-20
Attending: INTERNAL MEDICINE | Admitting: INTERNAL MEDICINE
Payer: COMMERCIAL

## 2017-02-20 ENCOUNTER — APPOINTMENT (OUTPATIENT)
Dept: CARDIAC REHAB | Age: 62
End: 2017-02-20
Attending: INTERNAL MEDICINE
Payer: COMMERCIAL

## 2017-02-20 ENCOUNTER — APPOINTMENT (OUTPATIENT)
Dept: GENERAL RADIOLOGY | Age: 62
End: 2017-02-20
Attending: INTERNAL MEDICINE
Payer: COMMERCIAL

## 2017-02-20 VITALS
TEMPERATURE: 97.6 F | SYSTOLIC BLOOD PRESSURE: 126 MMHG | WEIGHT: 160 LBS | BODY MASS INDEX: 25.71 KG/M2 | RESPIRATION RATE: 16 BRPM | HEART RATE: 66 BPM | OXYGEN SATURATION: 97 % | HEIGHT: 66 IN | DIASTOLIC BLOOD PRESSURE: 56 MMHG

## 2017-02-20 DIAGNOSIS — E11.00 UNCONTROLLED TYPE 2 DIABETES MELLITUS WITH HYPEROSMOLARITY WITHOUT COMA, WITHOUT LONG-TERM CURRENT USE OF INSULIN (HCC): ICD-10-CM

## 2017-02-20 DIAGNOSIS — A08.4 VIRAL GASTROENTERITIS: ICD-10-CM

## 2017-02-20 DIAGNOSIS — I20.8 STABLE ANGINA (HCC): ICD-10-CM

## 2017-02-20 PROBLEM — Z95.810 ICD (IMPLANTABLE CARDIOVERTER-DEFIBRILLATOR), SINGLE, IN SITU: Status: ACTIVE | Noted: 2017-02-20

## 2017-02-20 LAB
GLUCOSE BLD STRIP.AUTO-MCNC: 90 MG/DL (ref 65–100)
SERVICE CMNT-IMP: NORMAL

## 2017-02-20 PROCEDURE — 93641 EP EVL 1/2CHMB PAC CVDFB TST: CPT

## 2017-02-20 PROCEDURE — C1894 INTRO/SHEATH, NON-LASER: HCPCS

## 2017-02-20 PROCEDURE — 77030018836 HC SOL IRR NACL ICUM -A

## 2017-02-20 PROCEDURE — 74011250636 HC RX REV CODE- 250/636

## 2017-02-20 PROCEDURE — C1777 LEAD, AICD, ENDO SINGLE COIL: HCPCS

## 2017-02-20 PROCEDURE — 77030014450 HC INTRO SHTH ANGI MRTM -B

## 2017-02-20 PROCEDURE — 77030011640 HC PAD GRND REM COVD -A

## 2017-02-20 PROCEDURE — 76060000032 HC ANESTHESIA 0.5 TO 1 HR

## 2017-02-20 PROCEDURE — 74011250636 HC RX REV CODE- 250/636: Performed by: ANESTHESIOLOGY

## 2017-02-20 PROCEDURE — 74011250636 HC RX REV CODE- 250/636: Performed by: INTERNAL MEDICINE

## 2017-02-20 PROCEDURE — 74011000250 HC RX REV CODE- 250

## 2017-02-20 PROCEDURE — 82962 GLUCOSE BLOOD TEST: CPT

## 2017-02-20 PROCEDURE — C1892 INTRO/SHEATH,FIXED,PEEL-AWAY: HCPCS

## 2017-02-20 PROCEDURE — C1722 AICD, SINGLE CHAMBER: HCPCS

## 2017-02-20 PROCEDURE — 77030002996 HC SUT SLK J&J -A

## 2017-02-20 PROCEDURE — 77030031139 HC SUT VCRL2 J&J -A

## 2017-02-20 PROCEDURE — 77030011992 HC AIRWY NASOPHGL TELE -A: Performed by: NURSE ANESTHETIST, CERTIFIED REGISTERED

## 2017-02-20 PROCEDURE — 77030018729 HC ELECTRD DEFIB PAD CARD -B

## 2017-02-20 PROCEDURE — 71010 XR CHEST PORT: CPT

## 2017-02-20 RX ORDER — SODIUM CHLORIDE 0.9 % (FLUSH) 0.9 %
5-10 SYRINGE (ML) INJECTION AS NEEDED
Status: DISCONTINUED | OUTPATIENT
Start: 2017-02-20 | End: 2017-02-20 | Stop reason: HOSPADM

## 2017-02-20 RX ORDER — SODIUM CHLORIDE 9 MG/ML
INJECTION, SOLUTION INTRAVENOUS
Status: DISCONTINUED | OUTPATIENT
Start: 2017-02-20 | End: 2017-02-20 | Stop reason: HOSPADM

## 2017-02-20 RX ORDER — LIDOCAINE HYDROCHLORIDE 10 MG/ML
INJECTION INFILTRATION; PERINEURAL
Status: COMPLETED
Start: 2017-02-20 | End: 2017-02-20

## 2017-02-20 RX ORDER — PROPOFOL 10 MG/ML
INJECTION, EMULSION INTRAVENOUS
Status: DISCONTINUED | OUTPATIENT
Start: 2017-02-20 | End: 2017-02-20 | Stop reason: HOSPADM

## 2017-02-20 RX ORDER — ONDANSETRON 2 MG/ML
INJECTION INTRAMUSCULAR; INTRAVENOUS AS NEEDED
Status: DISCONTINUED | OUTPATIENT
Start: 2017-02-20 | End: 2017-02-20 | Stop reason: HOSPADM

## 2017-02-20 RX ORDER — SODIUM CHLORIDE 0.9 % (FLUSH) 0.9 %
5-10 SYRINGE (ML) INJECTION EVERY 8 HOURS
Status: DISCONTINUED | OUTPATIENT
Start: 2017-02-20 | End: 2017-02-20 | Stop reason: HOSPADM

## 2017-02-20 RX ORDER — MIDAZOLAM HYDROCHLORIDE 1 MG/ML
INJECTION, SOLUTION INTRAMUSCULAR; INTRAVENOUS AS NEEDED
Status: DISCONTINUED | OUTPATIENT
Start: 2017-02-20 | End: 2017-02-20 | Stop reason: HOSPADM

## 2017-02-20 RX ORDER — BACITRACIN 50000 [IU]/1
INJECTION, POWDER, FOR SOLUTION INTRAMUSCULAR
Status: COMPLETED
Start: 2017-02-20 | End: 2017-02-20

## 2017-02-20 RX ORDER — VANCOMYCIN HYDROCHLORIDE 1 G/20ML
INJECTION, POWDER, LYOPHILIZED, FOR SOLUTION INTRAVENOUS
Status: DISCONTINUED
Start: 2017-02-20 | End: 2017-02-20 | Stop reason: HOSPADM

## 2017-02-20 RX ORDER — DIPHENHYDRAMINE HYDROCHLORIDE 50 MG/ML
12.5 INJECTION, SOLUTION INTRAMUSCULAR; INTRAVENOUS
Status: DISCONTINUED | OUTPATIENT
Start: 2017-02-20 | End: 2017-02-20 | Stop reason: HOSPADM

## 2017-02-20 RX ORDER — BACITRACIN 50000 [IU]/1
50000 INJECTION, POWDER, FOR SOLUTION INTRAMUSCULAR ONCE
Status: COMPLETED | OUTPATIENT
Start: 2017-02-20 | End: 2017-02-20

## 2017-02-20 RX ORDER — ACETAMINOPHEN 325 MG/1
650 TABLET ORAL
Status: DISCONTINUED | OUTPATIENT
Start: 2017-02-20 | End: 2017-02-20 | Stop reason: HOSPADM

## 2017-02-20 RX ORDER — MIDAZOLAM HYDROCHLORIDE 1 MG/ML
INJECTION, SOLUTION INTRAMUSCULAR; INTRAVENOUS
Status: DISCONTINUED
Start: 2017-02-20 | End: 2017-02-20 | Stop reason: HOSPADM

## 2017-02-20 RX ORDER — MORPHINE SULFATE 10 MG/ML
2 INJECTION, SOLUTION INTRAMUSCULAR; INTRAVENOUS
Status: DISCONTINUED | OUTPATIENT
Start: 2017-02-20 | End: 2017-02-20 | Stop reason: HOSPADM

## 2017-02-20 RX ORDER — ASPIRIN 81 MG/1
81 TABLET ORAL DAILY
Qty: 90 TAB | Refills: 1 | Status: SHIPPED | OUTPATIENT
Start: 2017-02-20 | End: 2017-08-17 | Stop reason: SDUPTHER

## 2017-02-20 RX ORDER — SODIUM CHLORIDE 0.9 % (FLUSH) 0.9 %
5-10 SYRINGE (ML) INJECTION AS NEEDED
Status: CANCELLED | OUTPATIENT
Start: 2017-02-20

## 2017-02-20 RX ORDER — SODIUM CHLORIDE 0.9 % (FLUSH) 0.9 %
5-10 SYRINGE (ML) INJECTION EVERY 8 HOURS
Status: CANCELLED | OUTPATIENT
Start: 2017-02-20

## 2017-02-20 RX ORDER — SODIUM CHLORIDE, SODIUM LACTATE, POTASSIUM CHLORIDE, CALCIUM CHLORIDE 600; 310; 30; 20 MG/100ML; MG/100ML; MG/100ML; MG/100ML
25 INJECTION, SOLUTION INTRAVENOUS CONTINUOUS
Status: CANCELLED | OUTPATIENT
Start: 2017-02-20 | End: 2017-02-21

## 2017-02-20 RX ORDER — FENTANYL CITRATE 50 UG/ML
INJECTION, SOLUTION INTRAMUSCULAR; INTRAVENOUS
Status: DISCONTINUED
Start: 2017-02-20 | End: 2017-02-20 | Stop reason: HOSPADM

## 2017-02-20 RX ORDER — MIDAZOLAM HYDROCHLORIDE 1 MG/ML
1-5 INJECTION, SOLUTION INTRAMUSCULAR; INTRAVENOUS
Status: DISCONTINUED | OUTPATIENT
Start: 2017-02-20 | End: 2017-02-20 | Stop reason: HOSPADM

## 2017-02-20 RX ORDER — FENTANYL CITRATE 50 UG/ML
INJECTION, SOLUTION INTRAMUSCULAR; INTRAVENOUS AS NEEDED
Status: DISCONTINUED | OUTPATIENT
Start: 2017-02-20 | End: 2017-02-20 | Stop reason: HOSPADM

## 2017-02-20 RX ORDER — SODIUM CHLORIDE 900 MG/100ML
INJECTION INTRAVENOUS
Status: DISCONTINUED
Start: 2017-02-20 | End: 2017-02-20 | Stop reason: HOSPADM

## 2017-02-20 RX ORDER — HEPARIN SODIUM 200 [USP'U]/100ML
500 INJECTION, SOLUTION INTRAVENOUS ONCE
Status: COMPLETED | OUTPATIENT
Start: 2017-02-20 | End: 2017-02-20

## 2017-02-20 RX ORDER — FENTANYL CITRATE 50 UG/ML
25 INJECTION, SOLUTION INTRAMUSCULAR; INTRAVENOUS
Status: DISCONTINUED | OUTPATIENT
Start: 2017-02-20 | End: 2017-02-20 | Stop reason: HOSPADM

## 2017-02-20 RX ORDER — LIDOCAINE HYDROCHLORIDE 10 MG/ML
1-40 INJECTION INFILTRATION; PERINEURAL
Status: DISCONTINUED | OUTPATIENT
Start: 2017-02-20 | End: 2017-02-20 | Stop reason: HOSPADM

## 2017-02-20 RX ORDER — LIDOCAINE HYDROCHLORIDE 10 MG/ML
0.1 INJECTION, SOLUTION EPIDURAL; INFILTRATION; INTRACAUDAL; PERINEURAL AS NEEDED
Status: CANCELLED | OUTPATIENT
Start: 2017-02-20

## 2017-02-20 RX ORDER — HYDROMORPHONE HYDROCHLORIDE 1 MG/ML
.2-.5 INJECTION, SOLUTION INTRAMUSCULAR; INTRAVENOUS; SUBCUTANEOUS
Status: DISCONTINUED | OUTPATIENT
Start: 2017-02-20 | End: 2017-02-20 | Stop reason: HOSPADM

## 2017-02-20 RX ORDER — FENTANYL CITRATE 50 UG/ML
12.5-5 INJECTION, SOLUTION INTRAMUSCULAR; INTRAVENOUS
Status: DISCONTINUED | OUTPATIENT
Start: 2017-02-20 | End: 2017-02-20 | Stop reason: HOSPADM

## 2017-02-20 RX ORDER — SODIUM CHLORIDE, SODIUM LACTATE, POTASSIUM CHLORIDE, CALCIUM CHLORIDE 600; 310; 30; 20 MG/100ML; MG/100ML; MG/100ML; MG/100ML
25 INJECTION, SOLUTION INTRAVENOUS CONTINUOUS
Status: DISCONTINUED | OUTPATIENT
Start: 2017-02-20 | End: 2017-02-20 | Stop reason: HOSPADM

## 2017-02-20 RX ADMIN — FENTANYL CITRATE 50 MCG: 50 INJECTION, SOLUTION INTRAMUSCULAR; INTRAVENOUS at 07:53

## 2017-02-20 RX ADMIN — BACITRACIN 50000 UNITS: 5000 INJECTION, POWDER, FOR SOLUTION INTRAMUSCULAR at 08:31

## 2017-02-20 RX ADMIN — HEPARIN SODIUM 1000 UNITS: 200 INJECTION, SOLUTION INTRAVENOUS at 08:21

## 2017-02-20 RX ADMIN — PROPOFOL 75 MCG/KG/MIN: 10 INJECTION, EMULSION INTRAVENOUS at 07:53

## 2017-02-20 RX ADMIN — SODIUM CHLORIDE: 9 INJECTION, SOLUTION INTRAVENOUS at 07:47

## 2017-02-20 RX ADMIN — LIDOCAINE HYDROCHLORIDE 30 ML: 10 INJECTION, SOLUTION INFILTRATION; PERINEURAL at 08:18

## 2017-02-20 RX ADMIN — LIDOCAINE HYDROCHLORIDE 30 ML: 10 INJECTION INFILTRATION; PERINEURAL at 08:18

## 2017-02-20 RX ADMIN — BACITRACIN 50000 UNITS: 50000 INJECTION, POWDER, FOR SOLUTION INTRAMUSCULAR at 08:31

## 2017-02-20 RX ADMIN — MIDAZOLAM HYDROCHLORIDE 2 MG: 1 INJECTION, SOLUTION INTRAMUSCULAR; INTRAVENOUS at 07:49

## 2017-02-20 RX ADMIN — VANCOMYCIN HYDROCHLORIDE 1000 MG: 1 INJECTION, POWDER, LYOPHILIZED, FOR SOLUTION INTRAVENOUS at 07:55

## 2017-02-20 RX ADMIN — ONDANSETRON 4 MG: 2 INJECTION INTRAMUSCULAR; INTRAVENOUS at 08:33

## 2017-02-20 RX ADMIN — FENTANYL CITRATE 50 MCG: 50 INJECTION, SOLUTION INTRAMUSCULAR; INTRAVENOUS at 08:19

## 2017-02-20 NOTE — PROCEDURES
[address]  [phone]    Indications and Pre-Procedure Diagnosis:  Dylan Panda is a 59 y.o. male with ischemic cardiomyopathy and CHF is referred for single chamber defibrillator. The left ventricular ejection fraction is 30% and the patient is NYHA Class III. The patient has been on ace inhibitor and beta blocker therapy for greater than 3 months. Post Procedure Diagnosis:  Ischemic cardiomyopathy  CHF, chronic systolic    ICD Implant Procedure and Findings:  Informed consent was obtained and the patient was premedicated with cefazolin. The procedure was performed under local anesthesia. Continuous pulse oximetry and cuff pressure were monitored. During the procedure, the patient received Versed, Fentanyl and Propofol for sedation per anesthesia personnel. The left deltopectoral area was prepped and draped in the usual sterile fashion and was liberally infiltrated with 1% lidocaine. An incision was made over the left subpectoral area and a generator pocket was manually dissected. Access was achieved in the left axillary vein under fluoroscopic guidance and using the seldinger technique. Through the left axillary vein, pacing/defibrillation leads were positioned in appropriate regions in the right heart chambers where satisfactory pacing and sensing parameters were measured. Stability of the leads was assessed with deep breathing and there was no diaphragmatic pacing at 10V output. The leads were anchored using the sleeves and a pulse generator pocket fashioned using blunt dissection. The leads were then connected to the pulse generator. The pulse generator pocket was then liberally infiltrated with bacitracin solution, and the device implanted with a single silk fixation suture in the header to prevent migration. The wound was closed in layers using continuous 2-0 Vicryl and 4-0 Vicryl ending with a sub-cuticular closure.  Fluoroscopy and total procedure times were 1 and 20 minutes respectively. Estimated blood loss  <10 ml. Sharp count: correct. Specimen(s) collected: none. The following procedure related complication occurred: none. The following problems were encountered: none. Findings: successful ICD placement.     Device Data Measurements:  Lead Sensing (mV) Threshold (V)Pulse Width (ms) Impedance (Ohms)    RV 8.6  0.8  0.5   589      Defibrillator Function Testing  Induction Rhythm Energy (J) Impedance Polarity Success        (Ohms)  On T  VF  15  44  AX>B  Yes         Final Programmed Parameters  Bradycardia pacing rate  40 bpm  Pacing Mode    VVI  Pacing Output    3.5 V@ 0.5 ms  Fibrillation Detect Interval  210 bpm  First Shock Energy   25 J  Electrode Configuration  RV -  ATP Status    On      Supplies Summary available in the chart    Fern Darden MD, Kaley Marcos

## 2017-02-20 NOTE — PROGRESS NOTES
Pt ambulated in de la fuente without any difficulty. Pt denies any pain at this time. Drsg to left chest wall intact with moderate amount of bloody shadowing to drsg noted, no hematoma. Discharge instructions reviewed with patient & family member. Device booklet & temporary card given to pt's family member along with down  device.

## 2017-02-20 NOTE — DISCHARGE INSTRUCTIONS
[address]  [phone]        ICD/PACEMAKER DISCHARGE INSTRUCTIONS    Patient ID:  Deion Melgar  [id number]  81 y.o.  1955    Admit Date: 2/20/2017    Discharge Date: 2/20/2017     Admitting Physician: Cricket Bethea MD     Discharge Physician: Sanchez Jenkins NP/ Dr. Guardado Parents    Admission Diagnoses:   ischemic cardiomyopathy    Discharge Diagnoses: Active Problems:    ICD (implantable cardioverter-defibrillator), single, in situ (2/20/2017)      Overview: [employer] 2/20/17        Discharge Condition: Good    Cardiology Procedures this Admission:  single lead ICD placement    Disposition: home    Reference discharge instructions provided by nursing for diet and activity. Follow-up with Dr. Guardado Parents in 2 weeks. Please contact the office for an appointment at [phone]. Signed:  Sanchez Jenkins NP  2/20/2017  1:39 PM    DISCHARGE INSTRUCTIONS FOR PATIENTS WITH ICD'S AND PACEMAKERS    1. Carry you ID card for your ICD/Pacemaker with you at all times. This card will be given to you in the hospital or mailed to you. 2. Medic Alert Bracelets are available from your pharmacist to wear at all times. 3. Call for an appointment in 2 weeks [phone]. 4. The pacemaker will bulge slightly under your skin. An ICD bulges a little more because it is larger. The bulge will decrease in size over the next few weeks. Please notify the doctor's office if you notice any of the following around your ICD site:  A.  A bruise that does not go away  B. Soreness or yellow, green, or brown drainage from the site. C. Any swelling from the site. D. If you have a fever of 100 degrees or higher that lasts for a few days    INCISION CARE       1.  Leave dressing over your site until you see the doctor.    2.  Leave steri-strips over your site until they start to fall off.   3.   You may shower after as long as your incision isnt submerged or directly sprayed upon until well healed. 4.  For comfort, wear loose fitting clothing. 5.  Ice pack to affected shoulder for first 24 hours, wear your sling for 2 days. 6.  Report any signs of infection, fever, pain, swelling, redness, oozing, or heat at site especially if these symptoms increase after the first 3 to 4 days. ACTIVITY PRECAUTIONS     1. Avoid rough contact with the implant site. 2. No driving for 14 days. 3. Avoid lifting your arm over your head, carrying anything on the affected side, or lifting over 10 pounds for 30 days. For the first 2 days only bend your arm at the elbow. 4. Any extreme activity such as golf, weight lifting or exercise biking should be restricted for 60 days. 5. Do not carry objects by holding them against your implant site. 6.  No shooting rifles or any type of gun with the affected shoulder permanently. 7.  If you have an ICD, welding and chainsaws are prohibited. SPECIAL PRECAUTIONS     1. You should avoid all strong magnetic fields, such as arc welding, large transformers, large motors. Some ICD devices will beep if it detects a strong magnet. If this occurs, move out of the area. 2.  You may not have an MRI. 3.  Treatments or surgery that requires diathermy or electrocautery should be discussed with your doctor before scheduled. 4. Avoid radio frequency transmitters, including radar. 5. Advise dentist or other medical personnel you see that you have a pacemaker or ICD. 6.  Cell phones and microwave oven use is okay. 7.  If you plan to move or take a trip to a new area, the doctor's office will give you a name of a doctor to contact for any problems. SPECIAL INSTRUCTIONS ON SHOCKS   1. Notify your doctor for any of the following:      A. Anytime a shock is received in a 24 hour period. An office visit is not usually required for a single shock. B.  Two or more shocks in a row.   If you do not feel well, call the [facility], otherwise call your doctor. This may require an office visit. C. Two or more shocks spaced apart by several hours. This may require an office visit. 2.  Keep a record of events. Include date, time, symptoms and activity at that time. ANTIBIOTIC THERAPY    During the first 8 weeks after your pacemaker or ICD insertion, you may need antibiotics before any dental work or certain tests or operations. Let the dentist or doctor who is caring for you know that you have had an implanted device.

## 2017-02-20 NOTE — ANESTHESIA PREPROCEDURE EVALUATION
Anesthetic History   No history of anesthetic complications            Review of Systems / Medical History  Patient summary reviewed, nursing notes reviewed and pertinent labs reviewed    Pulmonary          Smoker         Neuro/Psych   Within defined limits           Cardiovascular            Dysrhythmias   CAD and cardiac stents    Exercise tolerance: >4 METS     GI/Hepatic/Renal           PUD     Endo/Other    Diabetes: well controlled, type 2  Hypothyroidism: well controlled  Arthritis and anemia     Other Findings              Physical Exam    Airway  Mallampati: II  TM Distance: 4 - 6 cm  Neck ROM: normal range of motion   Mouth opening: Normal     Cardiovascular    Rhythm: regular  Rate: normal    Murmur     Dental    Dentition: Edentulous     Pulmonary  Breath sounds clear to auscultation               Abdominal  GI exam deferred       Other Findings            Anesthetic Plan    ASA: 4  Anesthesia type: total IV anesthesia    Monitoring Plan: BIS      Induction: Intravenous  Anesthetic plan and risks discussed with: Patient and Son / Daughter

## 2017-02-20 NOTE — IP AVS SNAPSHOT
[address] [facility] 
[phone] Patient: Audra Miranda MRN: [id number] ERA:1/98/5228 You are allergic to the following Allergen Reactions Penicillins Other (comments) Doesn't remember reaction Recent Documentation Height Weight BMI Smoking Status 1.676 m 72.6 kg 25.82 kg/m2 Current Every Day Smoker Emergency Contacts Name Discharge Info Relation Home Work Mobile [facility] CAREGIVER [3] Parent [1] [phone] About your hospitalization You were admitted on:  February 20, 2017 You last received care in the:  [facility] 2 INTRVNTNL CARDIO You were discharged on:  February 20, 2017 Unit phone number:  [phone] Why you were hospitalized Your primary diagnosis was:  Not on File Your diagnoses also included:  Icd (Implantable Cardioverter-Defibrillator), Single, In Situ Providers Seen During Your Hospitalizations Provider Role Specialty Primary office phone Marcos Watters MD Attending Provider Cardiology [phone] Your Primary Care Physician (PCP) Primary Care Physician Office Phone Office Fax [place]  [phone] Follow-up Information Follow up With Details Comments Contact Info Moreno Nunez MD   [address] [facility] [address] [facility] 
[phone] Marcos Watters MD Go in 2 weeks  [address] [place] [address] [facility] 
[phone] Your Appointments Monday March 06, 2017  1:00 PM EST  
CARD PHASE II with [facility] CARDIOPULM PHASE II  
[facility] ([address]) [address] [facility]  
[phone]  Tuesday March 07, 2017  8:30 AM EST  
PACEMAKER with PACEMAKER, RCAM  
 [facility] [address] [place] [address]) [address] [facility]  
[phone] Current Discharge Medication List  
  
CONTINUE these medications which have CHANGED Dose & Instructions Dispensing Information Comments Morning Noon Evening Bedtime  
 atorvastatin 20 mg tablet Commonly known as:  LIPITOR What changed:  when to take this Your next dose is: Today, Tomorrow Other:  _________ Dose:  20 mg Take 1 Tab by mouth daily. Quantity:  90 Tab Refills:  1 CONTINUE these medications which have NOT CHANGED Dose & Instructions Dispensing Information Comments Morning Noon Evening Bedtime  
 aspirin delayed-release 81 mg tablet Your next dose is: Today, Tomorrow Other:  _________ Dose:  81 mg Take 1 Tab by mouth daily. Quantity:  90 Tab Refills:  1 Blood-Glucose Meter monitoring kit Your next dose is: Today, Tomorrow Other:  _________ Check every day. Brand per patient's choice Quantity:  1 Kit Refills:  0  
     
   
   
   
  
 carvedilol 6.25 mg tablet Commonly known as:  Domenica Chew Your next dose is: Today, Tomorrow Other:  _________ Dose:  6.25 mg Take 1 Tab by mouth two (2) times daily (with meals). Quantity:  60 Tab Refills:  12  
     
   
   
   
  
 glucose blood VI test strips strip Commonly known as:  ASCENSIA AUTODISC VI, ONE TOUCH ULTRA TEST VI Your next dose is: Today, Tomorrow Other:  _________ Check every day. Brand per patient's choice Quantity:  50 Strip Refills:  11 * Lancets Misc Your next dose is: Today, Tomorrow Other:  _________ Check QD Quantity:  1 Each Refills:  11  
     
   
   
   
  
 * ONETOUCH DELICA LANCETS 30 gauge Misc Generic drug:  lancets Your next dose is: Today, Tomorrow Other:  _________ USE DAILY TO CHECK BLOOD SUGAR Refills:  11  
     
   
   
   
  
 levothyroxine 88 mcg tablet Commonly known as:  SYNTHROID Your next dose is: Today, Tomorrow Other:  _________ Dose:  88 mcg Take 1 Tab by mouth Daily (before breakfast). Quantity:  90 Tab Refills:  1  
     
   
   
   
  
 lisinopril 2.5 mg tablet Commonly known as:  Donnald Code Your next dose is: Today, Tomorrow Other:  _________ Dose:  2.5 mg Take 1 Tab by mouth daily. Quantity:  30 Tab Refills:  2  
     
   
   
   
  
 metFORMIN 1,000 mg tablet Commonly known as:  GLUCOPHAGE Your next dose is: Today, Tomorrow Other:  _________ Dose:  1000 mg Take 1 Tab by mouth two (2) times daily (with meals). Quantity:  60 Tab Refills:  2  
     
   
   
   
  
 pantoprazole 40 mg tablet Commonly known as:  PROTONIX Your next dose is: Today, Tomorrow Other:  _________ Dose:  40 mg Take 1 Tab by mouth two (2) times a day. Quantity:  60 Tab Refills:  4  
     
   
   
   
  
 ticagrelor 90 mg tablet Commonly known as:  [employer] Your next dose is: Today, Tomorrow Other:  _________ Dose:  90 mg Take 1 Tab by mouth every twelve (12) hours every twelve (12) hours. Quantity:  60 Tab Refills:  11 * Notice: This list has 2 medication(s) that are the same as other medications prescribed for you. Read the directions carefully, and ask your doctor or other care provider to review them with you. Discharge Instructions [address] [facility], [address] [place] [address], [address] [place] [address]  [phone] ICD/PACEMAKER DISCHARGE INSTRUCTIONS Patient ID: 
Kayden Mcdonald 
[id number] 
39 y.o. 
1955 Admit Date: 2/20/2017 Discharge Date: 2/20/2017 Admitting Physician: Vee Jo MD  
 
Discharge Physician: Lucila Arellano NP/ Dr. Ely Klein 
 
Admission Diagnoses:  
ischemic cardiomyopathy Discharge Diagnoses: Active Problems: 
  ICD (implantable cardioverter-defibrillator), single, in situ (2/20/2017) Overview: [employer] 2/20/17 Discharge Condition: Good Cardiology Procedures this Admission:  single lead ICD placement Disposition: home Reference discharge instructions provided by nursing for diet and activity. Follow-up with Dr. Ely Klein in 2 weeks. Please contact the office for an appointment at [phone]. Signed: 
Lucila Arellano NP 
2/20/2017 
1:39 PM 
 
DISCHARGE INSTRUCTIONS FOR PATIENTS WITH ICD'S AND PACEMAKERS 1. Carry you ID card for your ICD/Pacemaker with you at all times. This card will be given to you in the hospital or mailed to you. 2. Medic Alert Bracelets are available from your pharmacist to wear at all times. 3. Call for an appointment in 2 weeks [phone]. 4. The pacemaker will bulge slightly under your skin. An ICD bulges a little more because it is larger. The bulge will decrease in size over the next few weeks. Please notify the doctor's office if you notice any of the following around your ICD site: A.  A bruise that does not go away B. Soreness or yellow, green, or brown drainage from the site. C. Any swelling from the site. D. If you have a fever of 100 degrees or higher that lasts for a few days INCISION CARE 1.  Leave dressing over your site until you see the doctor. 2.  Leave steri-strips over your site until they start to fall off.  
3.   You may shower after as long as your incision isnt submerged or directly sprayed upon until well healed. 4.  For comfort, wear loose fitting clothing. 5.  Ice pack to affected shoulder for first 24 hours, wear your sling for 2 days.  
6.  Report any signs of infection, fever, pain, swelling, redness, oozing, or heat at site especially if these symptoms increase after the first 3 to 4 days. ACTIVITY PRECAUTIONS 1. Avoid rough contact with the implant site. 2. No driving for 14 days. 3. Avoid lifting your arm over your head, carrying anything on the affected side, or lifting over 10 pounds for 30 days. For the first 2 days only bend your arm at the elbow. 4. Any extreme activity such as golf, weight lifting or exercise biking should be restricted for 60 days. 5. Do not carry objects by holding them against your implant site. 6.  No shooting rifles or any type of gun with the affected shoulder permanently. 7.  If you have an ICD, welding and chainsaws are prohibited. SPECIAL PRECAUTIONS 1. You should avoid all strong magnetic fields, such as arc welding, large transformers, large motors. Some ICD devices will beep if it detects a strong magnet. If this occurs, move out of the area. 2.  You may not have an MRI. 3.  Treatments or surgery that requires diathermy or electrocautery should be discussed with your doctor before scheduled. 4. Avoid radio frequency transmitters, including radar. 5. Advise dentist or other medical personnel you see that you have a pacemaker or ICD. 6.  Cell phones and microwave oven use is okay. 7.  If you plan to move or take a trip to a new area, the doctor's office will give you a name of a doctor to contact for any problems. SPECIAL INSTRUCTIONS ON SHOCKS 1. Notify your doctor for any of the following: A. Anytime a shock is received in a 24 hour period. An office visit is not usually required for a single shock. B.  Two or more shocks in a row. If you do not feel well, call the [facility], otherwise call your doctor. This may require an office visit. C. Two or more shocks spaced apart by several hours. This may require an office visit. 2.  Keep a record of events. Include date, time, symptoms and activity at that time. ANTIBIOTIC THERAPY During the first 8 weeks after your pacemaker or ICD insertion, you may need antibiotics before any dental work or certain tests or operations. Let the dentist or doctor who is caring for you know that you have had an implanted device. Discharge Orders None Introducing \[facility]\"" & HEALTH SERVICES! Dear Chanel Muir: Thank you for requesting a [employer] account. Our records indicate that you already have an active [employer] account. You can access your account anytime at [email or web address] Did you know that you can access your hospital and ER discharge instructions at any time in [employer] You can also review all of your test results from your hospital stay or ER visit. Additional Information If you have questions, please visit the Frequently Asked Questions section of the [employer] website at [email or web address] Remember, [employer] is NOT to be used for urgent needs. For medical emergencies, dial 911. Now available from your iPhone and Android! General Information Please provide this summary of care documentation to your next provider. Patient Signature:  ____________________________________________________________ Date:  ____________________________________________________________  
  
Cyrus Carroll Provider Signature:  ____________________________________________________________ Date:  ____________________________________________________________

## 2017-02-20 NOTE — PROGRESS NOTES
Richard Patterson is recovering post-procedure. L infraclavicular site dressing has mod sanguinous drainage present without swelling. VSS. Rhythm sinus. Richard Patterson denies complaints at this time. If recovery continues to progress without complication, discharge is planned for later today.           Elfego Fan NP DNP, RN, AGACNP-BC

## 2017-02-20 NOTE — IP AVS SNAPSHOT
Current Discharge Medication List  
  
Take these medications at their scheduled times Dose & Instructions Dispensing Information Comments Morning Noon Evening Bedtime  
 aspirin delayed-release 81 mg tablet Your next dose is: Today, Tomorrow Other:  ____________ Dose:  81 mg Take 1 Tab by mouth daily. Quantity:  90 Tab Refills:  1  
     
   
   
   
  
 atorvastatin 20 mg tablet Commonly known as:  LIPITOR Your next dose is: Today, Tomorrow Other:  ____________ Dose:  20 mg Take 1 Tab by mouth daily. Quantity:  90 Tab Refills:  1  
     
   
   
   
  
 carvedilol 6.25 mg tablet Commonly known as:  Joselo Tati Your next dose is: Today, Tomorrow Other:  ____________ Dose:  6.25 mg Take 1 Tab by mouth two (2) times daily (with meals). Quantity:  60 Tab Refills:  12  
     
   
   
   
  
 levothyroxine 88 mcg tablet Commonly known as:  SYNTHROID Your next dose is: Today, Tomorrow Other:  ____________ Dose:  88 mcg Take 1 Tab by mouth Daily (before breakfast). Quantity:  90 Tab Refills:  1  
     
   
   
   
  
 lisinopril 2.5 mg tablet Commonly known as:  Robertha Roup Your next dose is: Today, Tomorrow Other:  ____________ Dose:  2.5 mg Take 1 Tab by mouth daily. Quantity:  30 Tab Refills:  2  
     
   
   
   
  
 metFORMIN 1,000 mg tablet Commonly known as:  GLUCOPHAGE Your next dose is: Today, Tomorrow Other:  ____________ Dose:  1000 mg Take 1 Tab by mouth two (2) times daily (with meals). Quantity:  60 Tab Refills:  2  
     
   
   
   
  
 pantoprazole 40 mg tablet Commonly known as:  PROTONIX Your next dose is: Today, Tomorrow Other:  ____________ Dose:  40 mg Take 1 Tab by mouth two (2) times a day. Quantity:  60 Tab Refills:  4 ticagrelor 90 mg tablet Commonly known as:  [employer] Your next dose is: Today, Tomorrow Other:  ____________ Dose:  90 mg Take 1 Tab by mouth every twelve (12) hours every twelve (12) hours. Quantity:  60 Tab Refills:  11 Take these medications as directed Dose & Instructions Dispensing Information Comments Morning Noon Evening Bedtime Blood-Glucose Meter monitoring kit Your next dose is: Today, Tomorrow Other:  ____________ Check every day. Brand per patient's choice Quantity:  1 Kit Refills:  0  
     
   
   
   
  
 glucose blood VI test strips strip Commonly known as:  ASCENSIA AUTODISC VI, ONE TOUCH ULTRA TEST VI Your next dose is: Today, Tomorrow Other:  ____________ Check every day. Brand per patient's choice Quantity:  50 Strip Refills:  11 * Lancets Misc Your next dose is: Today, Tomorrow Other:  ____________ Check QD Quantity:  1 Each Refills:  11  
     
   
   
   
  
 * ONETOUCH DELICA LANCETS 30 gauge Misc Generic drug:  lancets Your next dose is: Today, Tomorrow Other:  ____________ USE DAILY TO CHECK BLOOD SUGAR Refills:  11 * Notice: This list has 2 medication(s) that are the same as other medications prescribed for you. Read the directions carefully, and ask your doctor or other care provider to review them with you.

## 2017-02-20 NOTE — INTERVAL H&P NOTE
H&P Update:  Ryann Espinoza was seen and examined. History and physical has been reviewed. The patient has been examined.  There have been no significant clinical changes since the completion of the originally dated History and Physical.    Signed By: Avel Campbell MD     February 20, 2017 8:38 AM

## 2017-02-20 NOTE — H&P (VIEW-ONLY)
Subjective:      Deana Hearn is a 59 y.o. male is here for EP consult. He had a pci last year and repeat echo post 3 months of med rx demonstrated cardiomyopathy. He has sob with exertion.      Patient Active Problem List    Diagnosis Date Noted    Ischemic cardiomyopathy 11/11/2016    Esophageal ulceration 10/26/2016    GIB (gastrointestinal bleeding) 10/26/2016    Acute kidney injury ([address] 10/26/2016    S/P coronary artery stent placement 10/25/2016    Unstable angina ([address] 10/24/2016    Angina, class III ([address] 10/24/2016    Chronic systolic congestive heart failure ([address] 08/30/2016    GUILLEN (dyspnea on exertion) 08/24/2016    Acquired hypothyroidism 07/11/2016    Mixed hyperlipidemia 07/11/2016    Diabetes type 2, uncontrolled ([address] 07/11/2016    Smoker 05/23/2016    Adhesive capsulitis of right shoulder 05/23/2016    Gastroesophageal reflux disease without esophagitis 05/23/2016      Karen Zelaya MD  Past Medical History   Diagnosis Date    Acquired hypothyroidism 7/11/2016    Acute kidney injury ([address] 10/26/2016    Adhesive capsulitis of right shoulder 5/23/2016    Chronic systolic congestive heart failure ([address] 8/30/2016    Chronic systolic congestive heart failure ([address] 8/30/2016    Diabetes type 2, uncontrolled ([address] 7/11/2016    Esophageal ulceration 10/26/2016     10/25/16 2 hemoclips placed    Gastroesophageal reflux disease without esophagitis 5/23/2016    GIB (gastrointestinal bleeding) 10/26/2016    Mixed hyperlipidemia 7/11/2016    S/P coronary artery stent placement 10/25/2016     10/24/16 PCI/EDUARDO x6 to prox LCx, [id number] RCA      Past Surgical History   Procedure Laterality Date    Pr abdomen surgery proc unlisted       hernia repair right    Upper gi endoscopy,ctrl bleed  10/25/2016          Upper gi endoscopy,biopsy  10/25/2016           Allergies   Allergen Reactions    Penicillins Other (comments)     Doesn't remember reaction      Family History Problem Relation Age of Onset    Heart Disease Father      cabg x2, valve repair    Hypertension Father     Diabetes Brother     Diabetes Maternal Grandmother     negative for cardiac disease  Social History     Social History    Marital status: SINGLE     Spouse name: N/A    Number of children: N/A    Years of education: N/A     Social History Main Topics    Smoking status: Current Every Day Smoker     Packs/day: 1.00     Years: 45.00    Smokeless tobacco: Never Used      Comment: down to 1/2 pack daily 11/29/16    Alcohol use No    Drug use: No    Sexual activity: No     Other Topics Concern    Not on file     Social History Narrative     Current Outpatient Prescriptions   Medication Sig    metFORMIN (GLUCOPHAGE) 1,000 mg tablet Take 1 Tab by mouth two (2) times daily (with meals).  lisinopril (PRINIVIL, ZESTRIL) 2.5 mg tablet Take 1 Tab by mouth daily.  carvedilol (COREG) 6.25 mg tablet Take 1 Tab by mouth two (2) times daily (with meals).  ticagrelor (BRILINTA) 90 mg tablet Take 1 Tab by mouth every twelve (12) hours every twelve (12) hours.  pantoprazole (PROTONIX) 40 mg tablet Take 1 Tab by mouth two (2) times a day.  levothyroxine (SYNTHROID) 88 mcg tablet Take 1 Tab by mouth Daily (before breakfast).  atorvastatin (LIPITOR) 20 mg tablet Take 1 Tab by mouth daily.  ONETOUCH DELICA LANCETS 30 gauge misc USE DAILY TO CHECK BLOOD SUGAR    aspirin delayed-release 81 mg tablet Take 1 Tab by mouth daily.  Blood-Glucose Meter monitoring kit Check every day. Brand per patient's choice    glucose blood VI test strips (ASCENSIA AUTODISC VI, ONE TOUCH ULTRA TEST VI) strip Check every day. Brand per patient's choice    Lancets misc Check QD     No current facility-administered medications for this visit.        Vitals:    01/26/17 0939   BP: 118/60   Pulse: 69   Resp: 16   SpO2: 98%   Weight: 159 lb 2 oz (72.2 kg)   Height: 5' 6\" (1.676 m)       I have reviewed the nurses notes, vitals, problem list, allergy list, medical history, family, social history and medications. Review of Symptoms:    General: Pt denies excessive weight gain or loss. Pt is able to conduct ADL's  HEENT: Denies blurred vision, headaches, epistaxis and difficulty swallowing. Respiratory: Denies shortness of breath, GUILLEN, wheezing or stridor. Cardiovascular: Denies precordial pain, palpitations, edema or PND  Gastrointestinal: Denies poor appetite, indigestion, abdominal pain or blood in stool  Urinary: Denies dysuria, pyuria  Musculoskeletal: Denies pain or swelling from muscles or joints  Neurologic: Denies tremor, paresthesias, or sensory motor disturbance  Skin: Denies rash, itching or texture change. Psych: Denies depression      Physical Exam:      General: Well developed, in no acute distress. HEENT: Eyes - PERRL, no jvd  Heart:  Normal S1/S2 negative S3 or S4. Regular, no murmur, gallop or rub.   Respiratory: Clear bilaterally x 4, no wheezing or rales  Abdomen:   Soft, non-tender, bowel sounds are active.   Extremities:  No edema, normal cap refill, no cyanosis. Musculoskeletal: No clubbing  Neuro: A&Ox3, speech clear, gait stable. Skin: Skin color is normal. No rashes or lesions.  Non diaphoretic  Vascular: 2+ pulses symmetric in all extremities    Cardiographics    Ekg: nsr    Echo - lvef 30    Results for orders placed or performed during the hospital encounter of 10/24/16   EKG, 12 LEAD, INITIAL   Result Value Ref Range    Ventricular Rate 87 BPM    Atrial Rate 87 BPM    P-R Interval 166 ms    QRS Duration 122 ms    Q-T Interval 380 ms    QTC Calculation (Bezet) 457 ms    Calculated P Axis 63 degrees    Calculated R Axis -3 degrees    Calculated T Axis 119 degrees    Diagnosis       Normal sinus rhythm  Possible Left atrial enlargement    Cannot rule out Inferior injury pattern  Confirmed by Graciela Chase ([id number]) on 10/26/2016 8:07:09 AM           Lab Results   Component Value Date/Time WBC 6.5 10/30/2016 05:24 AM    HGB 9.6 10/30/2016 05:24 AM    HCT 28.4 10/30/2016 05:24 AM    PLATELET 366 60/67/8017 05:24 AM    MCV 88.8 10/30/2016 05:24 AM      Lab Results   Component Value Date/Time    Sodium 141 10/29/2016 05:30 AM    Potassium 3.5 10/29/2016 05:30 AM    Chloride 107 10/29/2016 05:30 AM    CO2 26 10/29/2016 05:30 AM    Anion gap 8 10/29/2016 05:30 AM    Glucose 116 10/29/2016 05:30 AM    BUN 14 10/29/2016 05:30 AM    Creatinine 0.80 10/29/2016 05:30 AM    BUN/Creatinine ratio 18 10/29/2016 05:30 AM    GFR est AA >60 10/29/2016 05:30 AM    GFR est non-AA >60 10/29/2016 05:30 AM    Calcium 8.4 10/29/2016 05:30 AM    Bilirubin, total 0.9 10/28/2016 04:31 AM    ALT 19 10/28/2016 04:31 AM    AST 47 10/28/2016 04:31 AM    Alk. phosphatase 53 10/28/2016 04:31 AM    Protein, total 6.5 10/28/2016 04:31 AM    Albumin 2.9 10/28/2016 04:31 AM    Globulin 3.6 10/28/2016 04:31 AM    A-G Ratio 0.8 10/28/2016 04:31 AM         Assessment:     Assessment:        ICD-10-CM ICD-9-CM    1. Mixed hyperlipidemia E78.2 272.2 AMB POC EKG ROUTINE W/ 12 LEADS, INTER & REP   2. Ischemic cardiomyopathy I25.5 414.8    3. Chronic systolic congestive heart failure (HCC) I50.22 428.22      428.0      Orders Placed This Encounter    AMB POC EKG ROUTINE W/ 12 LEADS, INTER & REP     Order Specific Question:   Reason for Exam:     Answer:   ROUTINE        Plan:   Mr Bobby Robbins is a pleasant gentleman with an ischemic cardiomyopathy (EF 30%) and class II/III CHF. He is a candidate for an ICD. I discussed the risks/benefits/alternatives of the procedure with the patient. Risks include (but are not limited to) bleeding, heart block, infection, cva/mi/tamponade/death. The patient understands and agrees to proceed. Thank you for this interesting consultation. Continue medical management for cad. Thank you for allowing me to participate in Castro Bell 's care.     Aman Lorenz MD, Clive Carpenter

## 2017-02-20 NOTE — ANESTHESIA POSTPROCEDURE EVALUATION
Post-Anesthesia Evaluation and Assessment    Patient: Lazarus Lat MRN: [id number]  SSN: [id number]    YOB: 1955  Age: 59 y.o. Sex: male       Cardiovascular Function/Vital Signs  Visit Vitals    /56    Pulse 62    Temp 36.4 °C (97.6 °F)    Resp 16    Ht 5' 6\" (1.676 m)    Wt 72.6 kg (160 lb)    SpO2 96%    BMI 25.82 kg/m2       Patient is status post total IV anesthesia anesthesia for * No procedures listed *. Nausea/Vomiting: None    Postoperative hydration reviewed and adequate. Pain:  Pain Scale 1: Numeric (0 - 10) (02/20/17 0845)  Pain Intensity 1: 0 (02/20/17 0845)   Managed    Neurological Status:   Neuro  Neurologic State: Drowsy (post procedure) (02/20/17 0845)   At baseline    Mental Status and Level of Consciousness: Arousable    Pulmonary Status:   O2 Device: Room air (02/20/17 0915)   Adequate oxygenation and airway patent    Complications related to anesthesia: None    Post-anesthesia assessment completed.  No concerns    Signed By: Alyson Waterman MD     February 20, 2017

## 2017-02-22 ENCOUNTER — PATIENT OUTREACH (OUTPATIENT)
Dept: CARDIOLOGY CLINIC | Age: 62
End: 2017-02-22

## 2017-02-22 NOTE — PROGRESS NOTES
Called pt to follow up on hospital visit to [facility], discharged on 2/20/17 with a diagnosis ICD implant. Performed medication reconciliation with pt and pt reports that he has medications. Pt states understanding that bandage is to remain in place and pt states that his parents are staying with him currently as he cannot drive for 14 days. Pt states understanding that he is not to shower until after he sees Dr. Lisa Waller.  Pt does have follow up appt scheduled. Pt states that he has no needs at this time. Pt reports that wt is steady and he has no symptoms. Pt reports that he follows a low Na diet.

## 2017-02-24 ENCOUNTER — APPOINTMENT (OUTPATIENT)
Dept: CARDIAC REHAB | Age: 62
End: 2017-02-24
Attending: INTERNAL MEDICINE
Payer: COMMERCIAL

## 2017-02-27 ENCOUNTER — APPOINTMENT (OUTPATIENT)
Dept: CARDIAC REHAB | Age: 62
End: 2017-02-27
Attending: INTERNAL MEDICINE
Payer: COMMERCIAL

## 2017-03-01 ENCOUNTER — APPOINTMENT (OUTPATIENT)
Dept: CARDIAC REHAB | Age: 62
End: 2017-03-01
Attending: INTERNAL MEDICINE

## 2017-03-06 ENCOUNTER — HOSPITAL ENCOUNTER (OUTPATIENT)
Dept: CARDIAC REHAB | Age: 62
Discharge: HOME | End: 2017-03-06
Attending: INTERNAL MEDICINE
Payer: COMMERCIAL

## 2017-03-06 NOTE — CARDIO/PULMONARY
Pt had ICD implanted on 2/20/17. Called home number and lft msg to inquire when patient would be returning. Appts adjusted in computer.

## 2017-03-07 ENCOUNTER — CLINICAL SUPPORT (OUTPATIENT)
Dept: CARDIOLOGY CLINIC | Age: 62
End: 2017-03-07

## 2017-03-07 DIAGNOSIS — I50.22 CHRONIC SYSTOLIC CONGESTIVE HEART FAILURE (HCC): ICD-10-CM

## 2017-03-07 DIAGNOSIS — Z95.810 ICD (IMPLANTABLE CARDIOVERTER-DEFIBRILLATOR), SINGLE, IN SITU: ICD-10-CM

## 2017-03-07 DIAGNOSIS — I25.5 ISCHEMIC CARDIOMYOPATHY: Primary | ICD-10-CM

## 2017-03-07 DIAGNOSIS — Z95.810 ICD (IMPLANTABLE CARDIOVERTER-DEFIBRILLATOR), SINGLE, IN SITU: Primary | ICD-10-CM

## 2017-03-07 DIAGNOSIS — I25.5 ISCHEMIC CARDIOMYOPATHY: ICD-10-CM

## 2017-03-07 NOTE — PROGRESS NOTES
Audra Miranda  1955  Moreno Nunez MD          Subjective:    Patient is here for 2 week site check and device interrogation after placement of single chamber ICD. The patient denies chest pain or shortness of breath. Denies fever.      Patient Active Problem List    Diagnosis Date Noted    ICD (implantable cardioverter-defibrillator), single, in situ 02/20/2017    Ischemic cardiomyopathy 11/11/2016    Esophageal ulceration 10/26/2016    GIB (gastrointestinal bleeding) 10/26/2016    Acute kidney injury ([address] 10/26/2016    S/P coronary artery stent placement 10/25/2016    Unstable angina ([address] 10/24/2016    Angina, class III ([address] 10/24/2016    Chronic systolic congestive heart failure ([address] 08/30/2016    GUILLEN (dyspnea on exertion) 08/24/2016    Acquired hypothyroidism 07/11/2016    Mixed hyperlipidemia 07/11/2016    Diabetes type 2, uncontrolled ([address] 07/11/2016    Smoker 05/23/2016    Adhesive capsulitis of right shoulder 05/23/2016    Gastroesophageal reflux disease without esophagitis 05/23/2016      Past Medical History:   Diagnosis Date    Acquired hypothyroidism 7/11/2016    Acute kidney injury ([address] 10/26/2016    Adhesive capsulitis of right shoulder 5/23/2016    Chronic systolic congestive heart failure ([address] 8/30/2016    Chronic systolic congestive heart failure ([address] 8/30/2016    Diabetes type 2, uncontrolled ([address] 7/11/2016    Esophageal ulceration 10/26/2016    10/25/16 2 hemoclips placed    Gastroesophageal reflux disease without esophagitis 5/23/2016    GIB (gastrointestinal bleeding) 10/26/2016    Mixed hyperlipidemia 7/11/2016    S/P coronary artery stent placement 10/25/2016    10/24/16 PCI/EDUARDO x6 to prox LCx, [id number] RCA      Past Surgical History:   Procedure Laterality Date    ABDOMEN SURGERY PROC UNLISTED      hernia repair right    CARDIAC SURG PROCEDURE UNLIST  11/2016    stent x6, Dr Brent Mccollum ENDOSCOPY,BIOPSY  10/25/2016         UPPER GI ENDOSCOPY,CTRL BLEED  10/25/2016          Allergies   Allergen Reactions    Penicillins Other (comments)     Doesn't remember reaction      Family History   Problem Relation Age of Onset    Heart Disease Father      cabg x2, valve repair    Hypertension Father     Diabetes Brother     Diabetes Maternal Grandmother       Current Outpatient Prescriptions   Medication Sig    aspirin delayed-release 81 mg tablet Take 1 Tab by mouth daily.  metFORMIN (GLUCOPHAGE) 1,000 mg tablet Take 1 Tab by mouth two (2) times daily (with meals).  lisinopril (PRINIVIL, ZESTRIL) 2.5 mg tablet Take 1 Tab by mouth daily.  carvedilol (COREG) 6.25 mg tablet Take 1 Tab by mouth two (2) times daily (with meals).  ticagrelor (BRILINTA) 90 mg tablet Take 1 Tab by mouth every twelve (12) hours every twelve (12) hours.  pantoprazole (PROTONIX) 40 mg tablet Take 1 Tab by mouth two (2) times a day.  levothyroxine (SYNTHROID) 88 mcg tablet Take 1 Tab by mouth Daily (before breakfast).  atorvastatin (LIPITOR) 20 mg tablet Take 1 Tab by mouth daily. (Patient taking differently: Take 20 mg by mouth every evening.)    ONETOUCH DELICA LANCETS 30 gauge misc USE DAILY TO CHECK BLOOD SUGAR    Blood-Glucose Meter monitoring kit Check every day. Brand per patient's choice    glucose blood VI test strips (ASCENSIA AUTODISC VI, ONE TOUCH ULTRA TEST VI) strip Check every day. Brand per patient's choice    Lancets misc Check QD     No current facility-administered medications for this visit. Review of Systems:    General: Pt denies excessive weight gain or loss. Pt is able to conduct ADL's  Respiratory: Denies shortness of breath, GUILLEN, wheezing or stridor. Cardiovascular: Denies precordial pain, palpitations, edema or PND        Physical ExamPhysical Exam:      General: Well developed, in no acute distress. .  Chest: left subclavian pacer site approximated well, steri-strips intact  Neuro: A&Ox3, speech clear, gait stable. Assessment:   Assessment:     ICD-10-CM ICD-9-CM    1. Ischemic cardiomyopathy I25.5 414.8    2. ICD (implantable cardioverter-defibrillator), single, in situ Z95.810 V45.02         Plan:     Patient feels well following single chamber ICD placement for ICM. Left subclavian pacemaker site approximated well, no discharge. Steri-strips are intact. No erythema or heat. Follow up as planned in 3 mo.       Melanie Mario, ANP

## 2017-03-08 ENCOUNTER — HOSPITAL ENCOUNTER (OUTPATIENT)
Dept: CARDIAC REHAB | Age: 62
End: 2017-03-08
Attending: INTERNAL MEDICINE

## 2017-03-10 ENCOUNTER — HOSPITAL ENCOUNTER (OUTPATIENT)
Dept: CARDIAC REHAB | Age: 62
Discharge: HOME | End: 2017-03-10
Attending: INTERNAL MEDICINE

## 2017-03-10 NOTE — CARDIO/PULMONARY
Pt was called today, pt stated that he can return until after his June 6, 2017 MD appointment.  Will clarify with MD.

## 2017-03-13 ENCOUNTER — HOSPITAL ENCOUNTER (OUTPATIENT)
Dept: CARDIAC REHAB | Age: 62
Discharge: HOME | End: 2017-03-13
Attending: INTERNAL MEDICINE

## 2017-03-13 NOTE — CARDIO/PULMONARY
Pt was called 3- due to absence, pt last attended 2-17-17( visit 28/30), pt stated that he can not return to cardiac rehab until after his June 6, 2017 MD appointment. Please clarify, when can Mr. Zoraida Landa return to cardiac rehab.     Thanks

## 2017-03-15 ENCOUNTER — HOSPITAL ENCOUNTER (OUTPATIENT)
Dept: CARDIAC REHAB | Age: 62
End: 2017-03-15
Attending: INTERNAL MEDICINE

## 2017-03-17 ENCOUNTER — HOSPITAL ENCOUNTER (OUTPATIENT)
Dept: CARDIAC REHAB | Age: 62
Discharge: HOME | End: 2017-03-17
Attending: INTERNAL MEDICINE

## 2017-03-20 ENCOUNTER — APPOINTMENT (OUTPATIENT)
Dept: CARDIAC REHAB | Age: 62
End: 2017-03-20
Attending: INTERNAL MEDICINE

## 2017-03-22 NOTE — CARDIO/PULMONARY
Called patient states he is restricted from coming to cardiac rehab by his doctor until next visit in June of 2017. He will keep us posted and also will get us a letter clearing him to return.

## 2017-03-24 ENCOUNTER — APPOINTMENT (OUTPATIENT)
Dept: CARDIAC REHAB | Age: 62
End: 2017-03-24
Attending: INTERNAL MEDICINE

## 2017-03-27 ENCOUNTER — APPOINTMENT (OUTPATIENT)
Dept: CARDIAC REHAB | Age: 62
End: 2017-03-27
Attending: INTERNAL MEDICINE

## 2017-03-29 ENCOUNTER — APPOINTMENT (OUTPATIENT)
Dept: CARDIAC REHAB | Age: 62
End: 2017-03-29
Attending: INTERNAL MEDICINE

## 2017-03-31 ENCOUNTER — APPOINTMENT (OUTPATIENT)
Dept: CARDIAC REHAB | Age: 62
End: 2017-03-31
Attending: INTERNAL MEDICINE

## 2017-04-03 ENCOUNTER — APPOINTMENT (OUTPATIENT)
Dept: CARDIAC REHAB | Age: 62
End: 2017-04-03
Attending: INTERNAL MEDICINE
Payer: COMMERCIAL

## 2017-04-05 ENCOUNTER — APPOINTMENT (OUTPATIENT)
Dept: CARDIAC REHAB | Age: 62
End: 2017-04-05
Attending: INTERNAL MEDICINE
Payer: COMMERCIAL

## 2017-04-12 ENCOUNTER — HOSPITAL ENCOUNTER (OUTPATIENT)
Dept: CARDIAC REHAB | Age: 62
Discharge: HOME | End: 2017-04-12
Attending: INTERNAL MEDICINE

## 2017-04-14 ENCOUNTER — HOSPITAL ENCOUNTER (OUTPATIENT)
Dept: CARDIAC REHAB | Age: 62
Discharge: HOME | End: 2017-04-14
Attending: INTERNAL MEDICINE
Payer: COMMERCIAL

## 2017-04-14 VITALS — WEIGHT: 164 LBS | BODY MASS INDEX: 26.47 KG/M2

## 2017-04-14 DIAGNOSIS — Z95.5 STENTED CORONARY ARTERY: ICD-10-CM

## 2017-04-14 PROCEDURE — 93798 PHYS/QHP OP CAR RHAB W/ECG: CPT

## 2017-04-14 NOTE — CARDIO/PULMONARY
Patient comes in for visit today. He had an ICD placed in February and he has now been cleared by his MD to return to exercise. He is still eating heart healthy and walking 30 min. Daily.

## 2017-04-17 ENCOUNTER — HOSPITAL ENCOUNTER (OUTPATIENT)
Dept: CARDIAC REHAB | Age: 62
Discharge: HOME | End: 2017-04-17
Attending: INTERNAL MEDICINE
Payer: COMMERCIAL

## 2017-04-17 VITALS — WEIGHT: 163 LBS | BODY MASS INDEX: 26.31 KG/M2

## 2017-04-17 PROCEDURE — 93798 PHYS/QHP OP CAR RHAB W/ECG: CPT

## 2017-04-17 NOTE — CARDIO/PULMONARY
No changes since last visit- walks at home on off days and has dumbells. Lives alone would call 911 if needed help. Folowling Low sodium diet and weighs daily.

## 2017-04-19 ENCOUNTER — HOSPITAL ENCOUNTER (OUTPATIENT)
Dept: CARDIAC REHAB | Age: 62
Discharge: HOME | End: 2017-04-19
Attending: INTERNAL MEDICINE
Payer: COMMERCIAL

## 2017-04-19 PROCEDURE — 93798 PHYS/QHP OP CAR RHAB W/ECG: CPT

## 2017-04-21 ENCOUNTER — APPOINTMENT (OUTPATIENT)
Dept: CARDIAC REHAB | Age: 62
End: 2017-04-21
Attending: INTERNAL MEDICINE
Payer: COMMERCIAL

## 2017-04-21 ENCOUNTER — HOSPITAL ENCOUNTER (OUTPATIENT)
Dept: CARDIAC REHAB | Age: 62
Discharge: HOME | End: 2017-04-21
Attending: INTERNAL MEDICINE
Payer: COMMERCIAL

## 2017-04-21 VITALS — BODY MASS INDEX: 26.52 KG/M2 | HEIGHT: 66 IN | WEIGHT: 165 LBS

## 2017-04-21 DIAGNOSIS — Z95.5 STENTED CORONARY ARTERY: ICD-10-CM

## 2017-04-21 NOTE — CARDIO/PULMONARY
DISCHARGE EXIT INTERVIEW  QING COMBS  SANDY [birth year]    Current Outpatient Prescriptions   Medication Sig    aspirin delayed-release 81 mg tablet Take 1 Tab by mouth daily.  metFORMIN (GLUCOPHAGE) 1,000 mg tablet Take 1 Tab by mouth two (2) times daily (with meals).  lisinopril (PRINIVIL, ZESTRIL) 2.5 mg tablet Take 1 Tab by mouth daily.  carvedilol (COREG) 6.25 mg tablet Take 1 Tab by mouth two (2) times daily (with meals).  ticagrelor (BRILINTA) 90 mg tablet Take 1 Tab by mouth every twelve (12) hours every twelve (12) hours.  levothyroxine (SYNTHROID) 88 mcg tablet Take 1 Tab by mouth Daily (before breakfast).  atorvastatin (LIPITOR) 20 mg tablet Take 1 Tab by mouth daily. (Patient taking differently: Take 20 mg by mouth every evening.)    ONETOUCH DELICA LANCETS 30 gauge misc USE DAILY TO CHECK BLOOD SUGAR    Blood-Glucose Meter monitoring kit Check every day. Brand per patient's choice    glucose blood VI test strips (ASCENSIA AUTODISC VI, ONE TOUCH ULTRA TEST VI) strip Check every day. Brand per patient's choice    Lancets misc Check QD     No current facility-administered medications for this encounter. Medications, allergies and immunizations reviewed. Pt's initial goals were as follows:  1. Get stamina back and increase walking to 30 min daily. Pt walks 30 minutes on non-rehab days (4 x week). Pt plans to use dumb-bells he has at home. He has modified exercises for his use - has right frozen shoulder. Patient also plans to join [employer] located near his home for $10 a month for his cardio exercise. Stamina did improve but pt is now recovering from ICD implant and pt is now improving again. Does not believe he is back to his stamina level  before stent procedure. LVEF 35% as of 10/27/16. 2.  Rebuild muscles with weights after his shoulder is better. As above, pt has dumb-bells and plans to use them to build up his muscles.     3.  Eat heart healthy, increase vegetables, and watch sodium content. Pt uses pepper on his eggs in the morning. Encouraged to try Mrs. Raygoza. Also encouraged him to read food labels for sodium content. He admits to eating some TV dinner, breakfast bowls and a processed luncheon meat. Suggested some lower salt alternatives and use of the crock pot to make lower sodium meals. He eats fruits and likes some green vegetables. Encouraged to try carrots and celery with some hummus. Pt also counseled again on smoking cessation. States he still smokes about half a pack a day. Pt will continue to work on heart healthy/low sodium choices. We have enjoyed working with Mr. Eduard Girard and with him well. He is encouraged to call Cardiac Rehab department with questions or concerns.

## 2017-04-21 NOTE — DISCHARGE INSTRUCTIONS
DISCHARGE EXIT INTERVIEW  QING COMBS  SANDY [birth year]          Current Outpatient Prescriptions   Medication Sig    aspirin delayed-release 81 mg tablet Take 1 Tab by mouth daily.  metFORMIN (GLUCOPHAGE) 1,000 mg tablet Take 1 Tab by mouth two (2) times daily (with meals).  lisinopril (PRINIVIL, ZESTRIL) 2.5 mg tablet Take 1 Tab by mouth daily.  carvedilol (COREG) 6.25 mg tablet Take 1 Tab by mouth two (2) times daily (with meals).  ticagrelor (BRILINTA) 90 mg tablet Take 1 Tab by mouth every twelve (12) hours every twelve (12) hours.  levothyroxine (SYNTHROID) 88 mcg tablet Take 1 Tab by mouth Daily (before breakfast).  atorvastatin (LIPITOR) 20 mg tablet Take 1 Tab by mouth daily. (Patient taking differently: Take 20 mg by mouth every evening.)    ONETOUCH DELICA LANCETS 30 gauge misc USE DAILY TO CHECK BLOOD SUGAR    Blood-Glucose Meter monitoring kit Check every day. Brand per patient's choice    glucose blood VI test strips (ASCENSIA AUTODISC VI, ONE TOUCH ULTRA TEST VI) strip Check every day. Brand per patient's choice    Lancets misc Check QD      No current facility-administered medications for this encounter.       Medications, allergies and immunizations reviewed.     Pt's initial goals were as follows:  1. Get stamina back and increase walking to 30 min daily. Pt walks 30 minutes on non-rehab days (4 x week). Pt plans to use dumb-bells he has at home. He has modified exercises for his use - has right frozen shoulder. Patient also plans to join [employer] located near his home for $10 a month for his cardio exercise. Stamina did improve but pt is now recovering from ICD implant and pt is now improving again. Does not believe he is back to his stamina level before stent procedure. LVEF 35% as of 10/27/16. 2. Rebuild muscles with weights after his shoulder is better. As above, pt has dumb-bells and plans to use them to build up his muscles.    3. Eat heart healthy, increase vegetables, and watch sodium content. Pt uses pepper on his eggs in the morning. Encouraged to try Mrs. Raygoza. Also encouraged him to read food labels for sodium content. He admits to eating some TV dinner, breakfast bowls and a processed luncheon meat. Suggested some lower salt alternatives and use of the crock pot to make lower sodium meals. He eats fruits and likes some green vegetables. Encouraged to try carrots and celery with some hummus.      Pt will continue to work on heart healthy/low sodium choices.      We have enjoyed working with Sarah Scott Owens and with him well. He is encouraged to call Cardiac Rehab department with questions or concerns.

## 2017-04-24 ENCOUNTER — APPOINTMENT (OUTPATIENT)
Dept: CARDIAC REHAB | Age: 62
End: 2017-04-24
Attending: INTERNAL MEDICINE
Payer: COMMERCIAL

## 2017-04-24 DIAGNOSIS — E03.9 ACQUIRED HYPOTHYROIDISM: ICD-10-CM

## 2017-04-25 RX ORDER — LEVOTHYROXINE SODIUM 88 UG/1
88 TABLET ORAL
Qty: 30 TAB | Refills: 0 | Status: SHIPPED | OUTPATIENT
Start: 2017-04-25 | End: 2017-05-23 | Stop reason: SDUPTHER

## 2017-04-26 ENCOUNTER — APPOINTMENT (OUTPATIENT)
Dept: CARDIAC REHAB | Age: 62
End: 2017-04-26
Attending: INTERNAL MEDICINE
Payer: COMMERCIAL

## 2017-04-26 ENCOUNTER — OFFICE VISIT (OUTPATIENT)
Dept: CARDIOLOGY CLINIC | Age: 62
End: 2017-04-26

## 2017-04-26 VITALS
OXYGEN SATURATION: 98 % | WEIGHT: 164.25 LBS | HEIGHT: 66 IN | RESPIRATION RATE: 16 BRPM | HEART RATE: 72 BPM | BODY MASS INDEX: 26.4 KG/M2 | SYSTOLIC BLOOD PRESSURE: 126 MMHG | DIASTOLIC BLOOD PRESSURE: 70 MMHG

## 2017-04-26 DIAGNOSIS — E78.2 MIXED HYPERLIPIDEMIA: Primary | ICD-10-CM

## 2017-04-26 DIAGNOSIS — I50.22 SYSTOLIC CHF, CHRONIC (HCC): ICD-10-CM

## 2017-04-26 DIAGNOSIS — I25.10 CORONARY ARTERY DISEASE INVOLVING NATIVE CORONARY ARTERY OF NATIVE HEART WITHOUT ANGINA PECTORIS: ICD-10-CM

## 2017-04-26 DIAGNOSIS — I25.5 ISCHEMIC CARDIOMYOPATHY: ICD-10-CM

## 2017-04-26 DIAGNOSIS — Z95.810 ICD (IMPLANTABLE CARDIOVERTER-DEFIBRILLATOR), SINGLE, IN SITU: ICD-10-CM

## 2017-04-28 ENCOUNTER — OFFICE VISIT (OUTPATIENT)
Dept: FAMILY MEDICINE CLINIC | Age: 62
End: 2017-04-28

## 2017-04-28 VITALS
WEIGHT: 163.6 LBS | TEMPERATURE: 95.5 F | OXYGEN SATURATION: 98 % | BODY MASS INDEX: 26.29 KG/M2 | DIASTOLIC BLOOD PRESSURE: 62 MMHG | RESPIRATION RATE: 18 BRPM | HEIGHT: 66 IN | HEART RATE: 71 BPM | SYSTOLIC BLOOD PRESSURE: 101 MMHG

## 2017-04-28 DIAGNOSIS — K27.9 PU (PEPTIC ULCER): ICD-10-CM

## 2017-04-28 DIAGNOSIS — Z95.810 ICD (IMPLANTABLE CARDIOVERTER-DEFIBRILLATOR) IN PLACE: ICD-10-CM

## 2017-04-28 DIAGNOSIS — I50.22 CHRONIC SYSTOLIC CONGESTIVE HEART FAILURE (HCC): ICD-10-CM

## 2017-04-28 RX ORDER — ATORVASTATIN CALCIUM 20 MG/1
20 TABLET, FILM COATED ORAL DAILY
Qty: 90 TAB | Refills: 1 | Status: SHIPPED | OUTPATIENT
Start: 2017-04-28 | End: 2017-08-01 | Stop reason: SDUPTHER

## 2017-04-28 RX ORDER — OMEPRAZOLE 20 MG/1
20 CAPSULE, DELAYED RELEASE ORAL DAILY
Qty: 30 CAP | Refills: 2 | Status: SHIPPED | OUTPATIENT
Start: 2017-04-28

## 2017-04-28 NOTE — MR AVS SNAPSHOT
Visit Information Date & Time Provider Department Dept. Phone Encounter #  
 4/28/2017 11:45 AM Jorge Adamson MD [facility] at [address] [id number] Follow-up Instructions Return in about 2 months (around 6/28/2017) for annual exam.  
  
Your Appointments 6/6/2017  9:15 AM  
PACEMAKER with PACEMAKER, [facility] Appt Note: MDT SC ICD 3mo check, discuss carelink and set up [address] [facility]  
[phone] [address] [facility]  
  
    
 6/6/2017  9:30 AM  
3 MONTH with Torey Kim MD  
[facility] Appt Note: [place] ICD 3mo check, discuss carelink and set up [address] [facility]  
[phone] [address] [facility]  
  
    
 11/1/2017 10:45 AM  
6 MONTH with Scott Orozco MD  
[facility]) Appt Note: Per Dr. Vitaliy Vásquez cp? [address] [facility]  
[phone] [address] [facility] Upcoming Health Maintenance Date Due Hepatitis C Screening 1955 FOOT EXAM Q1 3/30/1963 EYE EXAM RETINAL OR DILATED Q1 3/30/1963 Pneumococcal 19-64 Medium Risk (1 of 1 - PPSV23) 3/30/1972 FOBT Q 1 YEAR AGE 50-75 3/30/2003 ZOSTER VACCINE AGE 60> 3/30/2013 LIPID PANEL Q1 6/20/2017 MICROALBUMIN Q1 8/16/2017 HEMOGLOBIN A1C Q6M 10/28/2017 DTaP/Tdap/Td series (2 - Td) 9/11/2023 Allergies as of 4/28/2017  Review Complete On: 4/28/2017 By: Josué Foster LPN Severity Noted Reaction Type Reactions Penicillins Low 05/23/2016   Not Verified Other (comments) Doesn't remember reaction Current Immunizations  Reviewed on 11/29/2016 Name Date Influenza Vaccine 10/27/2016 Influenza Vaccine (Quad) PF 10/30/2016  9:39 AM  
 Pneumococcal Conjugate (PCV-13) 6/20/2016 Not reviewed this visit You Were Diagnosed With   
  
 Codes Comments DM (diabetes mellitus), secondary, uncontrolled, with peripheral vascular complications ([facility] [address]    -  Primary ICD-10-CM: E13.51, E13.65 ICD-9-CM: 249.71, 443.81 Chronic systolic congestive heart failure (HCC)     ICD-10-CM: I50.22 ICD-9-CM: 428.22, 428.0 ICD (implantable cardioverter-defibrillator) in place     ICD-10-CM: Z95.810 ICD-9-CM: V45.02   
 PU (peptic ulcer)     ICD-10-CM: K27.9 ICD-9-CM: 533.90 Vitals BP Pulse Temp Resp Height(growth percentile) Weight(growth percentile) 101/62 (BP 1 Location: Right arm, BP Patient Position: Sitting) 71 95.5 °F (35.3 °C) (Oral) 18 5' 6\" (1.676 m) 163 lb 9.6 oz (74.2 kg) SpO2 BMI Smoking Status 98% 26.41 kg/m2 Current Every Day Smoker Vitals History BMI and BSA Data Body Mass Index Body Surface Area  
 26.41 kg/m 2 1.86 m 2 Preferred Pharmacy Pharmacy Name Phone [facility] [address] [place], [place] [phone] Your Updated Medication List  
  
   
This list is accurate as of: 4/28/17 12:29 PM.  Always use your most recent med list.  
  
  
  
  
 aspirin delayed-release 81 mg tablet Take 1 Tab by mouth daily. atorvastatin 20 mg tablet Commonly known as:  LIPITOR Take 1 Tab by mouth daily. Blood-Glucose Meter monitoring kit Check every day. Brand per patient's choice  
  
 carvedilol 6.25 mg tablet Commonly known as:  Kristal Isis Take 1 Tab by mouth two (2) times daily (with meals). glucose blood VI test strips strip Commonly known as:  ASCENSIA AUTODISC VI, ONE TOUCH ULTRA TEST VI Check every day. Brand per patient's choice * Lancets Misc Check QD  
  
 * ONETOUCH DELICA LANCETS 30 gauge Misc Generic drug:  lancets USE DAILY TO CHECK BLOOD SUGAR  
  
 levothyroxine 88 mcg tablet Commonly known as:  SYNTHROID Take 1 Tab by mouth Daily (before breakfast). lisinopril 2.5 mg tablet Commonly known as:  Rexanne  Take 1 Tab by mouth daily. metFORMIN 1,000 mg tablet Commonly known as:  GLUCOPHAGE Take 1 Tab by mouth two (2) times daily (with meals). omeprazole 20 mg capsule Commonly known as:  PRILOSEC Take 1 Cap by mouth daily. SITagliptin 100 mg tablet Commonly known as:  Vero Go Take 1 Tab by mouth daily. ticagrelor 90 mg tablet Commonly known as:  [employer] Take 1 Tab by mouth every twelve (12) hours every twelve (12) hours. * Notice: This list has 2 medication(s) that are the same as other medications prescribed for you. Read the directions carefully, and ask your doctor or other care provider to review them with you. Prescriptions Sent to Pharmacy Refills  
 atorvastatin (LIPITOR) 20 mg tablet 1 Sig: Take 1 Tab by mouth daily. Class: Normal  
 Pharmacy: [address] [place] [address] Ph #: [phone] Route: Oral  
 SITagliptin (JANUVIA) 100 mg tablet 2 Sig: Take 1 Tab by mouth daily. Class: Normal  
 Pharmacy: [address] [place] [address] Ph #: [phone] Route: Oral  
 omeprazole (PRILOSEC) 20 mg capsule 2 Sig: Take 1 Cap by mouth daily. Class: Normal  
 Pharmacy: [address] [place] [address] Ph #: [phone] Route: Oral  
  
We Performed the Following HEMOGLOBIN A1C W/O EAG [42392 CPT(R)] Follow-up Instructions Return in about 2 months (around 6/28/2017) for annual exam.  
  
  
Introducing [facility]! Dear Tonya Boucher: Thank you for requesting a [employer] account. Our records indicate that you already have an active GMG[employer] account. You can access your account anytime at [email or web address]. [email or web address] Did you know that you can access your hospital and ER discharge instructions at any time in [employer]? You can also review all of your test results from your hospital stay or ER visit. Additional Information If you have questions, please visit the Frequently Asked Questions section of the [employer] website at [email or web address] Remember, Sat[employer] is NOT to be used for urgent needs. For medical emergencies, dial 911. Now available from your iPhone and Android! Please provide this summary of care documentation to your next provider. Your primary care clinician is listed as Sirisha Mcintosh. If you have any questions after today's visit, please call [phone].

## 2017-04-28 NOTE — PROGRESS NOTES
Medina Chavarria MD          NAME:  Saúl Lilly   :   1955   MRN:   [id number]   PCP:  Azar Diaz MD           Subjective: The patient is a 57y.o. year old male  who returns for a routine follow-up. Since the last visit, patient reports no change in exercise tolerance, chest pain, edema, medication intolerance, palpitations, shortness of breath, PND/orthopnea wheezing, sputum, syncope, dizziness or light headedness. Doing well. Past Medical History:   Diagnosis Date    Acquired hypothyroidism 2016    Acute kidney injury ([facility] [address] 10/26/2016    Adhesive capsulitis of right shoulder 2016    Chronic systolic congestive heart failure ([facility] 2016    Chronic systolic congestive heart failure ([facility] 2016    Diabetes type 2, uncontrolled ([facility] 2016    Esophageal ulceration 10/26/2016    10/25/16 2 hemoclips placed    Gastroesophageal reflux disease without esophagitis 2016    GIB (gastrointestinal bleeding) 10/26/2016    ICD (implantable cardioverter-defibrillator) in place 2017    Mixed hyperlipidemia 2016    PU (peptic ulcer) 2017    S/P coronary artery stent placement 10/25/2016    10/24/16 PCI/EDUARDO x6 to prox LCx, [id number] RCA        ICD-10-CM ICD-9-CM    1. Mixed hyperlipidemia E78.2 272.2 AMB POC EKG ROUTINE W/ 12 LEADS, INTER & REP   2. Ischemic cardiomyopathy I25.5 414.8    3. ICD (implantable cardioverter-defibrillator), single, in situ Z95.810 V45.02    4. Systolic CHF, chronic (HCC) I50.22 428.22      428.0    5.  Coronary artery disease involving native coronary artery of native heart without angina pectoris I25.10 414.01       Social History   Substance Use Topics    Smoking status: Current Every Day Smoker     Packs/day: 0.50     Years: 45.00    Smokeless tobacco: Never Used      Comment: down to 1/2 pack daily 16    Alcohol use No      Family History   Problem Relation Age of Onset    Heart Disease Father      cabg x2, valve repair    Hypertension Father     Diabetes Brother     Diabetes Maternal Grandmother         Review of Systems  Cardiovascular: Negative except as noted in HPI      Objective:       Vitals:    04/26/17 1037 04/26/17 1042   BP: 130/70 126/70   Pulse: 72    Resp: 16    SpO2: 98%    Weight: 164 lb 4 oz (74.5 kg)    Height: 5' 6\" (1.676 m)     Body mass index is 26.51 kg/(m^2). General PE  Mental Status - Alert. General Appearance - Not in acute distress. Chest and Lung Exam   Inspection: Accessory muscles - No use of accessory muscles in breathing. Auscultation:   Breath sounds: - Normal.    Cardiovascular   Inspection: Jugular vein - Bilateral - Inspection Normal.  Palpation/Percussion:   Apical Impulse: - Normal.  Auscultation: Rhythm - Regular. Heart Sounds - S1 WNL and S2 WNL. No S3 or S4. Murmurs & Other Heart Sounds: Auscultation of the heart reveals - No Murmurs. Peripheral Vascular   Upper Extremity: Inspection - Bilateral - No Cyanotic nailbeds or Digital clubbing. Lower Extremity:   Palpation: Edema - Bilateral - No edema. Data Review:     EKG -  EKG: unchanged from previous tracings, normal sinus rhythm, nonspecific ST and T waves changes. LABS- @brieflabs@      Allergies reviewed  Allergies   Allergen Reactions    Penicillins Other (comments)     Doesn't remember reaction       Medications reviewed  Current Outpatient Prescriptions   Medication Sig    levothyroxine (SYNTHROID) 88 mcg tablet Take 1 Tab by mouth Daily (before breakfast).  aspirin delayed-release 81 mg tablet Take 1 Tab by mouth daily.  metFORMIN (GLUCOPHAGE) 1,000 mg tablet Take 1 Tab by mouth two (2) times daily (with meals).  lisinopril (PRINIVIL, ZESTRIL) 2.5 mg tablet Take 1 Tab by mouth daily.  carvedilol (COREG) 6.25 mg tablet Take 1 Tab by mouth two (2) times daily (with meals).  ticagrelor (BRILINTA) 90 mg tablet Take 1 Tab by mouth every twelve (12) hours every twelve (12) hours.     ONETOUCH DELICA LANCETS 30 gauge misc USE DAILY TO CHECK BLOOD SUGAR    Blood-Glucose Meter monitoring kit Check every day. Brand per patient's choice    glucose blood VI test strips (ASCENSIA AUTODISC VI, ONE TOUCH ULTRA TEST VI) strip Check every day. Brand per patient's choice    Lancets misc Check QD    atorvastatin (LIPITOR) 20 mg tablet Take 1 Tab by mouth daily.  SITagliptin (JANUVIA) 100 mg tablet Take 1 Tab by mouth daily.  omeprazole (PRILOSEC) 20 mg capsule Take 1 Cap by mouth daily. No current facility-administered medications for this visit. Assessment:       ICD-10-CM ICD-9-CM    1. Mixed hyperlipidemia E78.2 272.2 AMB POC EKG ROUTINE W/ 12 LEADS, INTER & REP   2. Ischemic cardiomyopathy I25.5 414.8    3. ICD (implantable cardioverter-defibrillator), single, in situ Z95.810 V45.02    4. Systolic CHF, chronic (HCC) I50.22 428.22      428.0    5. Coronary artery disease involving native coronary artery of native heart without angina pectoris I25.10 414.01         Orders Placed This Encounter    AMB POC EKG ROUTINE W/ 12 LEADS, INTER & REP     Order Specific Question:   Reason for Exam:     Answer:   routine       Plan:     GUILLEN; no angina. George Rx. CHF compensated. BP at target. EKG OK. Plan echo 3 mo post PCI.     Allie Mayo MD

## 2017-04-28 NOTE — PROGRESS NOTES
Lavell Castro is a 57 y.o. male    DM2: A1c 8.3 last, will need monitoring. Denies any endo symptoms. He's been gaining some weight but still good BMI 26.41    Cardio: since last visit he had ICD placed. Currently he's as he has been for the past 6-8 months. No CP or SOB. On ASA, Statin, betablocker and acei. Reviewed: active problem list, medication list, allergies, notes from last encounter, lab results, imaging    Pertinent items are noted in HPI. Allergies   Allergen Reactions    Penicillins Other (comments)     Doesn't remember reaction     Current Outpatient Prescriptions on File Prior to Visit   Medication Sig Dispense Refill    levothyroxine (SYNTHROID) 88 mcg tablet Take 1 Tab by mouth Daily (before breakfast). 30 Tab 0    aspirin delayed-release 81 mg tablet Take 1 Tab by mouth daily. 90 Tab 1    metFORMIN (GLUCOPHAGE) 1,000 mg tablet Take 1 Tab by mouth two (2) times daily (with meals). 60 Tab 2    lisinopril (PRINIVIL, ZESTRIL) 2.5 mg tablet Take 1 Tab by mouth daily. 30 Tab 2    carvedilol (COREG) 6.25 mg tablet Take 1 Tab by mouth two (2) times daily (with meals). 60 Tab 12    ticagrelor (BRILINTA) 90 mg tablet Take 1 Tab by mouth every twelve (12) hours every twelve (12) hours. 60 Tab 11    ONETOUCH DELICA LANCETS 30 gauge misc USE DAILY TO CHECK BLOOD SUGAR  11    Blood-Glucose Meter monitoring kit Check every day. Brand per patient's choice 1 Kit 0    glucose blood VI test strips (ASCENSIA AUTODISC VI, ONE TOUCH ULTRA TEST VI) strip Check every day. Brand per patient's choice 50 Strip 11    Lancets misc Check QD 1 Each 11     No current facility-administered medications on file prior to visit.       Patient Active Problem List   Diagnosis Code    Smoker F17.200    Adhesive capsulitis of right shoulder M75.01    Gastroesophageal reflux disease without esophagitis K21.9    Acquired hypothyroidism E03.9    Mixed hyperlipidemia E78.2    Diabetes type 2, uncontrolled ([facility] E11.65    GUILLEN (dyspnea on exertion) R06.09    Chronic systolic congestive heart failure (HCC) I50.22    Unstable angina (HCC) I20.0    Angina, class III (HCC) I20.9    S/P coronary artery stent placement Z95.5    Esophageal ulceration K22.10    GIB (gastrointestinal bleeding) K92.2    Acute kidney injury ([facility] [address] N17.9    Ischemic cardiomyopathy I25.5    ICD (implantable cardioverter-defibrillator), single, in situ Z95.810    ICD (implantable cardioverter-defibrillator) in place Z95.810    PU (peptic ulcer) K27.9       Visit Vitals    /62 (BP 1 Location: Right arm, BP Patient Position: Sitting)    Pulse 71    Temp 95.5 °F (35.3 °C) (Oral)    Resp 18    Ht 5' 6\" (1.676 m)    Wt 163 lb 9.6 oz (74.2 kg)    SpO2 98%    BMI 26.41 kg/m2     General:  alert, cooperative, no distress, appears stated age   Head:  NCAT w/o lesions or tenderness. Sclera white, eyes quiet    Lungs: clear to auscultation bilaterally   Heart:  regular rate and rhythm, S1, S2 normal, no murmur, click, rub or gallop   Abdomen: soft, non-tender   Neuro: normal without focal findings  mental status, speech normal, alert and oriented x iii  EDER  cranial nerves 2-12 intact   Affect & Behavior:  good grooming, full facial expressions, normal speech pattern and content, normal thought patterns, normal perception, good insight, normal reasoning        Assessment/Plans:    Мария Addison was seen today for diabetes and cholesterol problem. Diagnoses and all orders for this visit:    DM (diabetes mellitus), secondary, uncontrolled, with peripheral vascular complications (HCC)  -     SITagliptin (JANUVIA) 100 mg tablet; Take 1 Tab by mouth daily.  -     HEMOGLOBIN A1C W/O EAG    Chronic systolic congestive heart failure (HCC)  -     atorvastatin (LIPITOR) 20 mg tablet; Take 1 Tab by mouth daily. ICD (implantable cardioverter-defibrillator) in place    PU (peptic ulcer)  -     omeprazole (PRILOSEC) 20 mg capsule;  Take 1 Cap by mouth daily. Discussed plans, risk/benefits of treatments/observations. Through the use of shared decision making, above plans were agreed upon. Medication compliance advised. Patient verbalized understanding.      Follow-up Disposition:  Return in about 2 months (around 6/28/2017), or if symptoms worsen or fail to improve, for annual exam.      Susu Nino MD  4/30/2017

## 2017-04-29 LAB — HBA1C MFR BLD: 6.1 % (ref 4.8–5.6)

## 2017-05-18 RX ORDER — METFORMIN HYDROCHLORIDE 1000 MG/1
1000 TABLET ORAL 2 TIMES DAILY WITH MEALS
Qty: 60 TAB | Refills: 2 | Status: SHIPPED | OUTPATIENT
Start: 2017-05-18 | End: 2017-08-20 | Stop reason: SDUPTHER

## 2017-05-22 RX ORDER — LISINOPRIL 2.5 MG/1
TABLET ORAL
Qty: 30 TAB | Refills: 2 | Status: SHIPPED | OUTPATIENT
Start: 2017-05-22 | End: 2017-08-20 | Stop reason: SDUPTHER

## 2017-05-23 DIAGNOSIS — E03.9 ACQUIRED HYPOTHYROIDISM: ICD-10-CM

## 2017-05-24 RX ORDER — LEVOTHYROXINE SODIUM 88 UG/1
88 TABLET ORAL
Qty: 90 TAB | Refills: 1 | Status: SHIPPED | OUTPATIENT
Start: 2017-05-24 | End: 2017-11-12 | Stop reason: SDUPTHER

## 2017-06-06 ENCOUNTER — OFFICE VISIT (OUTPATIENT)
Dept: CARDIOLOGY CLINIC | Age: 62
End: 2017-06-06

## 2017-06-06 ENCOUNTER — CLINICAL SUPPORT (OUTPATIENT)
Dept: CARDIOLOGY CLINIC | Age: 62
End: 2017-06-06

## 2017-06-06 VITALS
WEIGHT: 167.2 LBS | RESPIRATION RATE: 16 BRPM | BODY MASS INDEX: 26.87 KG/M2 | HEART RATE: 76 BPM | HEIGHT: 66 IN | OXYGEN SATURATION: 97 % | DIASTOLIC BLOOD PRESSURE: 62 MMHG | SYSTOLIC BLOOD PRESSURE: 120 MMHG

## 2017-06-06 DIAGNOSIS — Z95.810 ICD (IMPLANTABLE CARDIOVERTER-DEFIBRILLATOR) IN PLACE: Primary | ICD-10-CM

## 2017-06-06 DIAGNOSIS — I50.22 CHRONIC SYSTOLIC CONGESTIVE HEART FAILURE (HCC): ICD-10-CM

## 2017-06-06 DIAGNOSIS — I50.22 CHRONIC SYSTOLIC CONGESTIVE HEART FAILURE (HCC): Primary | ICD-10-CM

## 2017-06-06 DIAGNOSIS — I42.0 DILATED CARDIOMYOPATHY (HCC): ICD-10-CM

## 2017-06-06 DIAGNOSIS — I25.5 ISCHEMIC CARDIOMYOPATHY: ICD-10-CM

## 2017-06-06 NOTE — PROGRESS NOTES
Subjective:      Allen Presley is a 57 y.o. male is here for device follow up. The patient denies chest pain/ shortness of breath, orthopnea, PND, LE edema, palpitations, syncope, presyncope or fatigue.        Patient Active Problem List    Diagnosis Date Noted    ICD (implantable cardioverter-defibrillator) in place 04/28/2017    PU (peptic ulcer) 04/28/2017    ICD (implantable cardioverter-defibrillator), single, in situ 02/20/2017    Ischemic cardiomyopathy 11/11/2016    Esophageal ulceration 10/26/2016    GIB (gastrointestinal bleeding) 10/26/2016    Acute kidney injury ([address] 10/26/2016    S/P coronary artery stent placement 10/25/2016    Unstable angina ([address] 10/24/2016    Angina, class III ([address] 10/24/2016    Chronic systolic congestive heart failure ([address] 08/30/2016    GUILLEN (dyspnea on exertion) 08/24/2016    Acquired hypothyroidism 07/11/2016    Mixed hyperlipidemia 07/11/2016    Diabetes type 2, uncontrolled ([address] 07/11/2016    Smoker 05/23/2016    Adhesive capsulitis of right shoulder 05/23/2016    Gastroesophageal reflux disease without esophagitis 05/23/2016      Bradley Lama MD  Past Medical History:   Diagnosis Date    Acquired hypothyroidism 7/11/2016    Acute kidney injury ([address] 10/26/2016    Adhesive capsulitis of right shoulder 5/23/2016    Chronic systolic congestive heart failure ([address] 8/30/2016    Chronic systolic congestive heart failure ([address] 8/30/2016    Diabetes type 2, uncontrolled ([address] 7/11/2016    Esophageal ulceration 10/26/2016    10/25/16 2 hemoclips placed    Gastroesophageal reflux disease without esophagitis 5/23/2016    GIB (gastrointestinal bleeding) 10/26/2016    ICD (implantable cardioverter-defibrillator) in place 4/28/2017    Mixed hyperlipidemia 7/11/2016    PU (peptic ulcer) 4/28/2017    S/P coronary artery stent placement 10/25/2016    10/24/16 PCI/EDUARDO x6 to prox LCx, [id number] RCA      Past Surgical History:   Procedure Laterality Date    ABDOMEN SURGERY PROC UNLISTED      hernia repair right    CARDIAC SURG PROCEDURE UNLIST  11/2016    stent x6, Dr Jadon Kaufman    [facility] GI ENDOSCOPY,BIOPSY  10/25/2016         UPPER GI ENDOSCOPY,CTRL BLEED  10/25/2016          Allergies   Allergen Reactions    Penicillins Other (comments)     Doesn't remember reaction      Family History   Problem Relation Age of Onset    Heart Disease Father      cabg x2, valve repair    Hypertension Father     Diabetes Brother     Diabetes Maternal Grandmother     negative for cardiac disease  Social History     Social History    Marital status: SINGLE     Spouse name: N/A    Number of children: N/A    Years of education: N/A     Social History Main Topics    Smoking status: Current Every Day Smoker     Packs/day: 0.50     Years: 45.00    Smokeless tobacco: Never Used      Comment: down to 1/2 pack daily 11/29/16    Alcohol use No    Drug use: No    Sexual activity: No     Other Topics Concern    None     Social History Narrative     Current Outpatient Prescriptions   Medication Sig    levothyroxine (SYNTHROID) 88 mcg tablet Take 1 Tab by mouth Daily (before breakfast).  lisinopril (PRINIVIL, ZESTRIL) 2.5 mg tablet TAKE 1 TAB BY MOUTH DAILY.  metFORMIN (GLUCOPHAGE) 1,000 mg tablet Take 1 Tab by mouth two (2) times daily (with meals).  atorvastatin (LIPITOR) 20 mg tablet Take 1 Tab by mouth daily.  SITagliptin (JANUVIA) 100 mg tablet Take 1 Tab by mouth daily.  omeprazole (PRILOSEC) 20 mg capsule Take 1 Cap by mouth daily.  aspirin delayed-release 81 mg tablet Take 1 Tab by mouth daily.  carvedilol (COREG) 6.25 mg tablet Take 1 Tab by mouth two (2) times daily (with meals).  ticagrelor (BRILINTA) 90 mg tablet Take 1 Tab by mouth every twelve (12) hours every twelve (12) hours.  ONETOUCH DELICA LANCETS 30 gauge misc USE DAILY TO CHECK BLOOD SUGAR    Blood-Glucose Meter monitoring kit Check every day. Brand per patient's choice    glucose blood VI test strips (ASCENSIA AUTODISC VI, ONE TOUCH ULTRA TEST VI) strip Check every day. Brand per patient's choice    Lancets misc Check QD     No current facility-administered medications for this visit. Vitals:    06/06/17 0909   BP: 120/62   Pulse: 76   Resp: 16   SpO2: 97%   Weight: 167 lb 3.2 oz (75.8 kg)   Height: 5' 6\" (1.676 m)       I have reviewed the nurses notes, vitals, problem list, allergy list, medical history, family, social history and medications. Review of Symptoms:    General: Pt denies excessive weight gain or loss. Pt is able to conduct ADL's  HEENT: Denies blurred vision, headaches, epistaxis and difficulty swallowing. Respiratory: Denies shortness of breath, GUILLEN, wheezing or stridor. Cardiovascular: Denies precordial pain, palpitations, edema or PND  Gastrointestinal: Denies poor appetite, indigestion, abdominal pain or blood in stool  Urinary: Denies dysuria, pyuria  Musculoskeletal: Denies pain or swelling from muscles or joints  Neurologic: Denies tremor, paresthesias, or sensory motor disturbance  Skin: Denies rash, itching or texture change. Psych: Denies depression      Physical Exam:      General: Well developed, in no acute distress. HEENT: Eyes - PERRL, no jvd  Heart:  Normal S1/S2 negative S3 or S4. Regular, no murmur, gallop or rub.   Respiratory: Clear bilaterally x 4, no wheezing or rales  Abdomen:   Soft, non-tender, bowel sounds are active.   Extremities:  No edema, normal cap refill, no cyanosis. Musculoskeletal: No clubbing  Neuro: A&Ox3, speech clear, gait stable. Skin: Skin color is normal. No rashes or lesions.  Non diaphoretic  Vascular: 2+ pulses symmetric in all extremities    Cardiographics    Ekg: nsr    icd - wnl    Results for orders placed or performed during the hospital encounter of 10/24/16   EKG, 12 LEAD, INITIAL   Result Value Ref Range    Ventricular Rate 87 BPM    Atrial Rate 87 BPM    P-R Interval 166 ms    QRS Duration 122 ms    Q-T Interval 380 ms    QTC Calculation (Bezet) 457 ms    Calculated P Axis 63 degrees    Calculated R Axis -3 degrees    Calculated T Axis 119 degrees    Diagnosis       Normal sinus rhythm  Possible Left atrial enlargement    Cannot rule out Inferior injury pattern  Confirmed by Alber Goldsmith ([id number]) on 10/26/2016 8:07:09 AM           Lab Results   Component Value Date/Time    WBC 4.2 02/15/2017 11:14 AM    HGB 12.1 02/15/2017 11:14 AM    HCT 37.2 02/15/2017 11:14 AM    PLATELET 184 03/06/0360 11:14 AM    MCV 91 02/15/2017 11:14 AM      Lab Results   Component Value Date/Time    Sodium 141 02/15/2017 11:14 AM    Potassium 3.9 02/15/2017 11:14 AM    Chloride 100 02/15/2017 11:14 AM    CO2 27 02/15/2017 11:14 AM    Anion gap 8 10/29/2016 05:30 AM    Glucose 139 02/15/2017 11:14 AM    BUN 7 02/15/2017 11:14 AM    Creatinine 0.67 02/15/2017 11:14 AM    BUN/Creatinine ratio 10 02/15/2017 11:14 AM    GFR est  02/15/2017 11:14 AM    GFR est non- 02/15/2017 11:14 AM    Calcium 8.8 02/15/2017 11:14 AM    Bilirubin, total 0.5 02/15/2017 11:14 AM    AST (SGOT) 14 02/15/2017 11:14 AM    Alk. phosphatase 65 02/15/2017 11:14 AM    Protein, total 6.8 02/15/2017 11:14 AM    Albumin 4.1 02/15/2017 11:14 AM    Globulin 3.6 10/28/2016 04:31 AM    A-G Ratio 1.5 02/15/2017 11:14 AM    ALT (SGPT) 13 02/15/2017 11:14 AM         Assessment:     Assessment:        ICD-10-CM ICD-9-CM    1. Chronic systolic congestive heart failure (HCC) I50.22 428.22 AMB POC EKG ROUTINE W/ 12 LEADS, INTER & REP     428.0    2. Dilated cardiomyopathy (HCC) I42.0 425.4      Orders Placed This Encounter    AMB POC EKG ROUTINE W/ 12 LEADS, INTER & REP     Order Specific Question:   Reason for Exam:     Answer:   routine        Plan:   Mr Addison Troncoso is doing well. He is in sinus and asymptomatic. His ICD is wnl. HGe will follow up in the device clinic per routine and with me in one year.       Continue medical management for cardiomyopathy. Thank you for allowing me to participate in Lavell Maria Fernandamusa 's care.     Marvin Washintgon MD, Emily Buchanan

## 2017-06-07 NOTE — PROGRESS NOTES
See device report - MDT ICD in office check, enrolled in remote home monitoring, next check due in 1 yr with every 3 month remote home monitoring.

## 2017-06-30 ENCOUNTER — OFFICE VISIT (OUTPATIENT)
Dept: FAMILY MEDICINE CLINIC | Age: 62
End: 2017-06-30

## 2017-06-30 VITALS
OXYGEN SATURATION: 96 % | RESPIRATION RATE: 16 BRPM | SYSTOLIC BLOOD PRESSURE: 95 MMHG | BODY MASS INDEX: 26.84 KG/M2 | HEART RATE: 85 BPM | HEIGHT: 66 IN | DIASTOLIC BLOOD PRESSURE: 59 MMHG | WEIGHT: 167 LBS | TEMPERATURE: 95.7 F

## 2017-06-30 DIAGNOSIS — K40.90 LEFT INGUINAL HERNIA: ICD-10-CM

## 2017-06-30 DIAGNOSIS — F17.200 SMOKING: ICD-10-CM

## 2017-06-30 DIAGNOSIS — E03.9 ACQUIRED HYPOTHYROIDISM: ICD-10-CM

## 2017-06-30 DIAGNOSIS — Z79.899 ENCOUNTER FOR LONG-TERM (CURRENT) USE OF OTHER MEDICATIONS: ICD-10-CM

## 2017-06-30 DIAGNOSIS — R01.1 HEART MURMUR: ICD-10-CM

## 2017-06-30 DIAGNOSIS — I35.0 AORTIC VALVE STENOSIS, UNSPECIFIED ETIOLOGY: ICD-10-CM

## 2017-06-30 DIAGNOSIS — Z00.00 ANNUAL PHYSICAL EXAM: Primary | ICD-10-CM

## 2017-06-30 NOTE — PROGRESS NOTES
Subjective:   Kathryn Matthews is a 57 y.o. male presenting for his annual checkup. ROS:  Feeling well. No dyspnea or chest pain on exertion. No abdominal pain, change in bowel habits, black or bloody stools. No urinary tract or prostatic symptoms. No neurological complaints. HTN: BP is low right now, but he denies lightheaded or any symptoms, says it does that in the morning and will go up again. Dm2 A1C down to 6.1% in April 2017. Will d/c januvia. Refer colonoscopy. He refuse PSA and prostate exam.     Known heart murmur, followed by cardiology. Moderate aortic stenosis. Left inguinal hernia. Not ischemic. He doesn't want surgery right now. Patient Active Problem List    Diagnosis Date Noted    Smoking 06/30/2017    Heart murmur 06/30/2017    Left inguinal hernia 06/30/2017    Aortic valve stenosis 06/30/2017    ICD (implantable cardioverter-defibrillator) in place 04/28/2017    PU (peptic ulcer) 04/28/2017    ICD (implantable cardioverter-defibrillator), single, in situ 02/20/2017    Ischemic cardiomyopathy 11/11/2016    Esophageal ulceration 10/26/2016    GIB (gastrointestinal bleeding) 10/26/2016    Acute kidney injury ([address] 10/26/2016    S/P coronary artery stent placement 10/25/2016    Unstable angina ([address] 10/24/2016    Angina, class III ([address] 10/24/2016    Chronic systolic congestive heart failure ([address] 08/30/2016    GUILLEN (dyspnea on exertion) 08/24/2016    Acquired hypothyroidism 07/11/2016    Mixed hyperlipidemia 07/11/2016    Diabetes type 2, uncontrolled ([address] 07/11/2016    Smoker 05/23/2016    Adhesive capsulitis of right shoulder 05/23/2016    Gastroesophageal reflux disease without esophagitis 05/23/2016     Current Outpatient Prescriptions   Medication Sig Dispense Refill    levothyroxine (SYNTHROID) 88 mcg tablet Take 1 Tab by mouth Daily (before breakfast). 90 Tab 1    lisinopril (PRINIVIL, ZESTRIL) 2.5 mg tablet TAKE 1 TAB BY MOUTH DAILY.  27 Tab 2    metFORMIN (GLUCOPHAGE) 1,000 mg tablet Take 1 Tab by mouth two (2) times daily (with meals). 60 Tab 2    atorvastatin (LIPITOR) 20 mg tablet Take 1 Tab by mouth daily. 90 Tab 1    omeprazole (PRILOSEC) 20 mg capsule Take 1 Cap by mouth daily. 30 Cap 2    aspirin delayed-release 81 mg tablet Take 1 Tab by mouth daily. 90 Tab 1    carvedilol (COREG) 6.25 mg tablet Take 1 Tab by mouth two (2) times daily (with meals). 60 Tab 12    ticagrelor (BRILINTA) 90 mg tablet Take 1 Tab by mouth every twelve (12) hours every twelve (12) hours. 60 Tab 11    ONETOUCH DELICA LANCETS 30 gauge misc USE DAILY TO CHECK BLOOD SUGAR  11    Blood-Glucose Meter monitoring kit Check every day. Brand per patient's choice 1 Kit 0    glucose blood VI test strips (ASCENSIA AUTODISC VI, ONE TOUCH ULTRA TEST VI) strip Check every day.   Brand per patient's choice 50 Strip 11    Lancets misc Check QD 1 Each 11     Allergies   Allergen Reactions    Penicillins Other (comments)     Doesn't remember reaction     Past Surgical History:   Procedure Laterality Date    ABDOMEN SURGERY PROC UNLISTED      hernia repair right    CARDIAC SURG PROCEDURE UNLIST  11/2016    stent x6, Dr Shelia Vazquez    HX PACEMAKER      UPPER GI ENDOSCOPY,BIOPSY  10/25/2016         UPPER GI ENDOSCOPY,CTRL BLEED  10/25/2016          Family History   Problem Relation Age of Onset    Heart Disease Father      cabg x2, valve repair    Hypertension Father     Diabetes Brother     Diabetes Maternal Grandmother      Social History   Substance Use Topics    Smoking status: Current Every Day Smoker     Packs/day: 0.50     Years: 45.00    Smokeless tobacco: Never Used      Comment: down to 1/2 pack daily 11/29/16    Alcohol use No          Objective:     Visit Vitals    BP 95/59 (BP 1 Location: Right arm, BP Patient Position: Sitting)    Pulse 85    Temp 95.7 °F (35.4 °C) (Oral)    Resp 16    Ht 5' 6\" (1.676 m)    Wt 167 lb (75.8 kg)    SpO2 96%    BMI 26.95 kg/m2     General:  Alert, cooperative, no distress, appears stated age. Head:  Normocephalic, without obvious abnormality, atraumatic. Eyes:  Conjunctivae/corneas clear. PERRL, EOMs intact. Ears:  Ear was impaction   Throat: Lips, mucosa, and tongue normal. Teeth and gums normal.   Neck: Supple, symmetrical, trachea midline, and no JVD. Back:   Symmetric, no curvature. ROM normal. No CVA tenderness. Lungs:   Clear to auscultation bilaterally. Heart:  Regular rate and rhythm, S1, S2 normal, no murmur, click, rub or gallop. Abdomen:   Soft, non-tender. Bowel sounds normal. No masses,  No organomegaly. Genitalia:  no penile lesions or discharge, no testicular masses or tenderness. HERNIA EXAM: left inguinal hernia, reducible. Extremities: Extremities normal, atraumatic, no cyanosis or edema. Pulses: 2+ and symmetric all extremities. Skin: Skin color, texture, turgor normal. No rashes or lesions   Neurologic: CNII-XII intact. Normal strength, sensation and reflexes throughout.        Assessment/Plan:   Moy Anderson was seen today for complete physical.    Diagnoses and all orders for this visit:    Annual physical exam  -     REFERRAL FOR COLONOSCOPY  -     HEPATITIS C AB  -     CBC W/O DIFF  -     LIPID PANEL  -     METABOLIC PANEL, COMPREHENSIVE  -     MICROALBUMIN, UR, RAND W/ MICROALBUMIN/CREA RATIO  -     TSH RFX ON ABNORMAL TO FREE T4  -     URINALYSIS W/ RFLX MICROSCOPIC    Left inguinal hernia    DM (diabetes mellitus), secondary, uncontrolled, with peripheral vascular complications (HCC)  -     METABOLIC PANEL, COMPREHENSIVE  -     MICROALBUMIN, UR, RAND W/ MICROALBUMIN/CREA RATIO    Acquired hypothyroidism  -     TSH RFX ON ABNORMAL TO FREE T4    Encounter for long-term (current) use of other medications  -     HEPATITIS C AB  -     CBC W/O DIFF  -     LIPID PANEL  -     METABOLIC PANEL, COMPREHENSIVE  -     MICROALBUMIN, UR, RAND W/ MICROALBUMIN/CREA RATIO  -     TSH RFX ON ABNORMAL TO FREE T4  -     URINALYSIS W/ RFLX MICROSCOPIC    Smoking    Heart murmur - known heart murmur    Aortic valve stenosis, unspecified etiology      Follow-up Disposition:  Return in about 4 months (around 10/30/2017) for chronic care, sooner as needed.       Jb Munoz MD  6/30/2017

## 2017-06-30 NOTE — PROGRESS NOTES
1. Have you been to the ER, urgent care clinic since your last visit? Hospitalized since your last visit? No    2. Have you seen or consulted any other health care providers outside of the [address] [place] [address] since your last visit? Include any pap smears or colon screening.  Yes Where: heart doctor     Pt states he is here for physical

## 2017-06-30 NOTE — MR AVS SNAPSHOT
Visit Information Date & Time Provider Department Dept. Phone Encounter #  
 6/30/2017 11:00 AM Ivis Dunbar MD [facility] at [address] [id number] Your Appointments 11/1/2017 10:45 AM  
6 MONTH with Ceci Rosado MD  
[facility] [address] [facility]) Appt Note: Per Dr. Hima Lassiter cp? [facility]  
[phone] [address]  
  
    
  
 9/11/2017  8:00 AM  
REMOTE OFFICE VISIT with [facility] [address]) Appt Note: NOT AN OFFICE VISIT, REMOTE MDT ICD  
 [address]  
[phone] [address] Upcoming Health Maintenance Date Due  
 FOOT EXAM Q1 3/30/1963 EYE EXAM RETINAL OR DILATED Q1 3/30/1963 Pneumococcal 19-64 Medium Risk (1 of 1 - PPSV23) 3/30/1972 FOBT Q 1 YEAR AGE 50-75 3/30/2003 INFLUENZA AGE 9 TO ADULT 8/1/2017 MICROALBUMIN Q1 8/16/2017 HEMOGLOBIN A1C Q6M 10/28/2017 LIPID PANEL Q1 6/30/2018 DTaP/Tdap/Td series (2 - Td) 9/11/2023 Allergies as of 6/30/2017  Review Complete On: 6/30/2017 By: Gwendolyn Berry LPN Severity Noted Reaction Type Reactions Penicillins Low 05/23/2016   Not Verified Other (comments) Doesn't remember reaction Current Immunizations  Reviewed on 11/29/2016 Name Date Influenza Vaccine 10/27/2016 Influenza Vaccine (Quad) PF 10/30/2016  9:39 AM  
 Pneumococcal Conjugate (PCV-13) 6/20/2016 Not reviewed this visit You Were Diagnosed With   
  
 Codes Comments Annual physical exam    -  Primary ICD-10-CM: Z00.00 ICD-9-CM: V70.0 DM (diabetes mellitus), secondary, uncontrolled, with peripheral vascular complications ([facility]     IGE-99-DO: E13.51, E13.65 ICD-9-CM: 249.71, 443.81 Angina, class III ([facility].)     ICD-10-CM: I20.9 ICD-9-CM: 413.9 Acquired hypothyroidism     ICD-10-CM: E03.9 ICD-9-CM: 244.9 Encounter for long-term (current) use of other medications     ICD-10-CM: Z79.899 ICD-9-CM: V58.69 Smoking     ICD-10-CM: F17.200 ICD-9-CM: 305.1 Heart murmur     ICD-10-CM: R01.1 ICD-9-CM: 182. 2 Left inguinal hernia     ICD-10-CM: K40.90 ICD-9-CM: 550.90 Aortic valve stenosis, unspecified etiology     ICD-10-CM: I35.0 ICD-9-CM: 424.1 Vitals BP Pulse Temp Resp Height(growth percentile) Weight(growth percentile) 95/59 (BP 1 Location: Right arm, BP Patient Position: Sitting) 85 95.7 °F (35.4 °C) (Oral) 16 5' 6\" (1.676 m) 167 lb (75.8 kg) SpO2 BMI Smoking Status 96% 26.95 kg/m2 Current Every Day Smoker Vitals History BMI and BSA Data Body Mass Index Body Surface Area  
 26.95 kg/m 2 1.88 m 2 Preferred Pharmacy Pharmacy Name Phone [facility] [address] [place] [address] [phone] Your Updated Medication List  
  
   
This list is accurate as of: 6/30/17 11:36 AM.  Always use your most recent med list.  
  
  
  
  
 aspirin delayed-release 81 mg tablet Take 1 Tab by mouth daily. atorvastatin 20 mg tablet Commonly known as:  LIPITOR Take 1 Tab by mouth daily. Blood-Glucose Meter monitoring kit Check every day. Brand per patient's choice  
  
 carvedilol 6.25 mg tablet Commonly known as:  Ladona Lindo Take 1 Tab by mouth two (2) times daily (with meals). glucose blood VI test strips strip Commonly known as:  ASCENSIA AUTODISC VI, ONE TOUCH ULTRA TEST VI Check every day. Brand per patient's choice * Lancets Misc Check QD  
  
 * ONETOUCH DELICA LANCETS 30 gauge Misc Generic drug:  lancets USE DAILY TO CHECK BLOOD SUGAR  
  
 levothyroxine 88 mcg tablet Commonly known as:  SYNTHROID Take 1 Tab by mouth Daily (before breakfast). lisinopril 2.5 mg tablet Commonly known as:  PRINIVIL, ZESTRIL  
TAKE 1 TAB BY MOUTH DAILY. metFORMIN 1,000 mg tablet Commonly known as:  GLUCOPHAGE Take 1 Tab by mouth two (2) times daily (with meals). omeprazole 20 mg capsule Commonly known as:  PRILOSEC Take 1 Cap by mouth daily. ticagrelor 90 mg tablet Commonly known as:  [employer] Take 1 Tab by mouth every twelve (12) hours every twelve (12) hours. * Notice: This list has 2 medication(s) that are the same as other medications prescribed for you. Read the directions carefully, and ask your doctor or other care provider to review them with you. We Performed the Following CBC W/O DIFF [78595 CPT(R)] HEPATITIS C AB [83667 CPT(R)] LIPID PANEL [31501 CPT(R)] METABOLIC PANEL, COMPREHENSIVE [88542 CPT(R)] MICROALBUMIN, UR, RAND W/ MICROALBUMIN/CREA RATIO [id number] CPT(R)] REFERRAL FOR COLONOSCOPY [MNI727 Custom] Comments:  
 First screening colonoscopy TSH RFX ON ABNORMAL TO FREE T4 [[id number] Custom] URINALYSIS W/ RFLX MICROSCOPIC [72945 CPT(R)] Referral Information Referral ID Referred By Referred To  
  
 [id number] ANASTACIA, [address] [place] [address], [facility] [address] [place] [address], [address] Phone: [phone] Fax: [phone] Visits Status Start Date End Date 1 New Request 6/30/17 6/30/18 If your referral has a status of pending review or denied, additional information will be sent to support the outcome of this decision. Introducing [facility] & HEALTH SERVICES! Dear Nba Gaines: Thank you for requesting a [employer] account. Our records indicate that you already have an active [employer] account. You can access your account anytime at [email or web address] Did you know that you can access your hospital and ER discharge instructions at any time in [employer]? You can also review all of your test results from your hospital stay or ER visit. Additional Information If you have questions, please visit the Frequently Asked Questions section of the "[employer]t website at [email or web address] Remember, 24/7 Card is NOT to be used for urgent needs. For medical emergencies, dial 911. Now available from your iPhone and Android! Please provide this summary of care documentation to your next provider. Your primary care clinician is listed as Akira Camp. If you have any questions after today's visit, please call [phone].

## 2017-07-01 LAB
ALBUMIN SERPL-MCNC: 4.4 G/DL (ref 3.6–4.8)
ALBUMIN/CREAT UR: <3.9 MG/G CREAT (ref 0–30)
ALBUMIN/GLOB SERPL: 1.5 {RATIO} (ref 1.2–2.2)
ALP SERPL-CCNC: 75 IU/L (ref 39–117)
ALT SERPL-CCNC: 14 IU/L (ref 0–44)
APPEARANCE UR: CLEAR
AST SERPL-CCNC: 14 IU/L (ref 0–40)
BILIRUB SERPL-MCNC: 0.4 MG/DL (ref 0–1.2)
BILIRUB UR QL STRIP: NEGATIVE
BUN SERPL-MCNC: 9 MG/DL (ref 8–27)
BUN/CREAT SERPL: 11 (ref 10–24)
CALCIUM SERPL-MCNC: 9.4 MG/DL (ref 8.6–10.2)
CHLORIDE SERPL-SCNC: 99 MMOL/L (ref 96–106)
CHOLEST SERPL-MCNC: 156 MG/DL (ref 100–199)
CO2 SERPL-SCNC: 24 MMOL/L (ref 18–29)
COLOR UR: YELLOW
CREAT SERPL-MCNC: 0.83 MG/DL (ref 0.76–1.27)
CREAT UR-MCNC: 77.3 MG/DL
ERYTHROCYTE [DISTWIDTH] IN BLOOD BY AUTOMATED COUNT: 14.3 % (ref 12.3–15.4)
GLOBULIN SER CALC-MCNC: 3 G/DL (ref 1.5–4.5)
GLUCOSE SERPL-MCNC: 130 MG/DL (ref 65–99)
GLUCOSE UR QL: NEGATIVE
HCT VFR BLD AUTO: 40 % (ref 37.5–51)
HCV AB S/CO SERPL IA: >11 S/CO RATIO (ref 0–0.9)
HDLC SERPL-MCNC: 31 MG/DL
HGB BLD-MCNC: 13.3 G/DL (ref 12.6–17.7)
HGB UR QL STRIP: NEGATIVE
KETONES UR QL STRIP: NEGATIVE
LDLC SERPL CALC-MCNC: 79 MG/DL (ref 0–99)
LEUKOCYTE ESTERASE UR QL STRIP: NEGATIVE
MCH RBC QN AUTO: 30.4 PG (ref 26.6–33)
MCHC RBC AUTO-ENTMCNC: 33.3 G/DL (ref 31.5–35.7)
MCV RBC AUTO: 92 FL (ref 79–97)
MICRO URNS: NORMAL
MICROALBUMIN UR-MCNC: <3 UG/ML
NITRITE UR QL STRIP: NEGATIVE
PH UR STRIP: 6 [PH] (ref 5–7.5)
PLATELET # BLD AUTO: 214 X10E3/UL (ref 150–379)
POTASSIUM SERPL-SCNC: 4.5 MMOL/L (ref 3.5–5.2)
PROT SERPL-MCNC: 7.4 G/DL (ref 6–8.5)
PROT UR QL STRIP: NEGATIVE
RBC # BLD AUTO: 4.37 X10E6/UL (ref 4.14–5.8)
SODIUM SERPL-SCNC: 138 MMOL/L (ref 134–144)
SP GR UR: 1.01 (ref 1–1.03)
T4 FREE SERPL-MCNC: 1.45 NG/DL (ref 0.82–1.77)
TRIGL SERPL-MCNC: 229 MG/DL (ref 0–149)
TSH SERPL DL<=0.005 MIU/L-ACNC: 8.74 UIU/ML (ref 0.45–4.5)
UROBILINOGEN UR STRIP-MCNC: 0.2 MG/DL (ref 0.2–1)
VLDLC SERPL CALC-MCNC: 46 MG/DL (ref 5–40)
WBC # BLD AUTO: 6.7 X10E3/UL (ref 3.4–10.8)

## 2017-08-01 ENCOUNTER — OFFICE VISIT (OUTPATIENT)
Dept: FAMILY MEDICINE CLINIC | Age: 62
End: 2017-08-01

## 2017-08-01 VITALS
TEMPERATURE: 95.5 F | HEIGHT: 66 IN | BODY MASS INDEX: 27.1 KG/M2 | RESPIRATION RATE: 16 BRPM | HEART RATE: 68 BPM | SYSTOLIC BLOOD PRESSURE: 131 MMHG | WEIGHT: 168.6 LBS | DIASTOLIC BLOOD PRESSURE: 75 MMHG | OXYGEN SATURATION: 98 %

## 2017-08-01 DIAGNOSIS — E78.2 MIXED HYPERLIPIDEMIA: ICD-10-CM

## 2017-08-01 DIAGNOSIS — B18.2 HEP C W/O COMA, CHRONIC (HCC): Primary | ICD-10-CM

## 2017-08-01 DIAGNOSIS — I50.22 CHRONIC SYSTOLIC CONGESTIVE HEART FAILURE (HCC): ICD-10-CM

## 2017-08-01 RX ORDER — ATORVASTATIN CALCIUM 20 MG/1
20 TABLET, FILM COATED ORAL DAILY
Qty: 90 TAB | Refills: 1
Start: 2017-08-01 | End: 2017-10-23 | Stop reason: SDUPTHER

## 2017-08-01 RX ORDER — ATORVASTATIN CALCIUM 40 MG/1
40 TABLET, FILM COATED ORAL DAILY
Qty: 90 TAB | Refills: 1
Start: 2017-08-01 | End: 2017-08-01 | Stop reason: CLARIF

## 2017-08-01 NOTE — PROGRESS NOTES
Man Hoffmann is a 57 y.o. male    Hypertriglyceridemia: will add on fish oil. Keep lipitor at 20mg. Hep C positive. He tells me now he was told previously that he have Hep B. We'll get more labs. As well US and refer him to liver specialist.  Discussed Hep C and it's potential complication. He only drinks socially, not daily, advice to stop. Last few LFT were normal.     Reviewed: active problem list, medication list, allergies, notes from last encounter, lab results    A comprehensive review of systems was negative except for that written in the HPI. Allergies   Allergen Reactions    Penicillins Other (comments)     Doesn't remember reaction     Current Outpatient Prescriptions on File Prior to Visit   Medication Sig Dispense Refill    levothyroxine (SYNTHROID) 88 mcg tablet Take 1 Tab by mouth Daily (before breakfast). 90 Tab 1    lisinopril (PRINIVIL, ZESTRIL) 2.5 mg tablet TAKE 1 TAB BY MOUTH DAILY. 30 Tab 2    metFORMIN (GLUCOPHAGE) 1,000 mg tablet Take 1 Tab by mouth two (2) times daily (with meals). 60 Tab 2    aspirin delayed-release 81 mg tablet Take 1 Tab by mouth daily. 90 Tab 1    carvedilol (COREG) 6.25 mg tablet Take 1 Tab by mouth two (2) times daily (with meals). 60 Tab 12    ticagrelor (BRILINTA) 90 mg tablet Take 1 Tab by mouth every twelve (12) hours every twelve (12) hours. 60 Tab 11    ONETOUCH DELICA LANCETS 30 gauge misc USE DAILY TO CHECK BLOOD SUGAR  11    Blood-Glucose Meter monitoring kit Check every day. Brand per patient's choice 1 Kit 0    glucose blood VI test strips (ASCENSIA AUTODISC VI, ONE TOUCH ULTRA TEST VI) strip Check every day. Brand per patient's choice 50 Strip 11    Lancets misc Check QD 1 Each 11    omeprazole (PRILOSEC) 20 mg capsule Take 1 Cap by mouth daily. 30 Cap 2     No current facility-administered medications on file prior to visit.       Patient Active Problem List   Diagnosis Code    Smoker F17.200    Adhesive capsulitis of right shoulder M75.01    Gastroesophageal reflux disease without esophagitis K21.9    Acquired hypothyroidism E03.9    Mixed hyperlipidemia E78.2    Diabetes type 2, uncontrolled (HCC) E11.65    GUILLEN (dyspnea on exertion) R06.09    Chronic systolic congestive heart failure (HCC) I50.22    Unstable angina (HCC) I20.0    Angina, class III (HCC) I20.9    S/P coronary artery stent placement Z95.5    Esophageal ulceration K22.10    GIB (gastrointestinal bleeding) K92.2    Acute kidney injury ([address] N17.9    Ischemic cardiomyopathy I25.5    ICD (implantable cardioverter-defibrillator), single, in situ Z95.810    ICD (implantable cardioverter-defibrillator) in place Z95.810    PU (peptic ulcer) K27.9    Smoking F17.200    Heart murmur R01.1    Left inguinal hernia K40.90    Aortic valve stenosis I35.0       Visit Vitals    /75    Pulse 68    Temp 95.5 °F (35.3 °C) (Oral)    Resp 16    Ht 5' 6\" (1.676 m)    Wt 168 lb 9.6 oz (76.5 kg)    SpO2 98%    BMI 27.21 kg/m2     General appearance: alert, cooperative, no distress, appears stated age  Neurologic: Alert and oriented X 3, normal strength and tone, symmetric. Normal without focal findings. Cranial nerves 2-12 intact. Normal coordination and gait. Mental status: Alert, oriented, thought content appropriate, affect: stable, mood-congruent. Head: Normocephalic, without obvious abnormality, atraumatic  Eyes: conjunctivae/corneas clear. PERRL, EOM's intact. Neck: supple, symmetrical, trachea midline, no JVD  Lungs: clear to auscultation bilaterally  Heart: regular rate and rhythm, S1, S2 normal, no murmur, click, rub or gallop  Abdomen: soft, non-tender. Extremities: extremities normal, atraumatic, no cyanosis or edema      Assessment/Plans:    Diagnoses and all orders for this visit:    1.  Hep C w/o coma, chronic (HCC)  -     HEPATITIS PANEL, ACUTE  -     HEPATITIS C QT BY PCR WITH REFLEX GENOTYPE  -     PROTHROMBIN TIME + INR  -  ABD LTD; Future  -     REFERRAL TO LIVER HEPATOLOGY    2. Mixed hyperlipidemia    3. Chronic systolic congestive heart failure (HCC)  -     atorvastatin (LIPITOR) 20 mg tablet; Take 1 Tab by mouth daily. Discussed plans, risk/benefits of treatments/observations. Through the use of shared decision making, above plans were agreed upon. Medication compliance advised. Patient verbalized understanding. Follow-up Disposition:  Return in about 3 months (around 11/1/2017) for chronic med management.       Suzi Ybarra MD  8/1/2017

## 2017-08-01 NOTE — MR AVS SNAPSHOT
Visit Information Date & Time Provider Department Dept. Phone Encounter #  
 8/1/2017  9:30 AM Lucia Goss MD [facility] at [address] [id number] Follow-up Instructions Return in about 3 months (around 11/1/2017) for chronic med management. Your Appointments 11/1/2017 10:45 AM  
6 MONTH with Yobani Luque MD  
[facility] [address]) Appt Note: Per Dr. Amando Becker cp? 1850 [facility]  
[phone] [address]  
  
    
  
 9/11/2017  8:00 AM  
REMOTE OFFICE VISIT with [facility] [address]) Appt Note: NOT AN OFFICE VISIT, REMOTE MDT ICD  
 [address]  
[phone] [address] Upcoming Health Maintenance Date Due  
 FOOT EXAM Q1 3/30/1965 EYE EXAM RETINAL OR DILATED Q1 3/30/1963 Pneumococcal 19-64 Medium Risk (1 of 1 - PPSV23) 3/30/1972 FOBT Q 1 YEAR AGE 50-75 3/30/2003 INFLUENZA AGE 9 TO ADULT 8/1/2017 HEMOGLOBIN A1C Q6M 10/28/2017 MICROALBUMIN Q1 6/30/2018 LIPID PANEL Q1 6/30/2018 DTaP/Tdap/Td series (2 - Td) 9/11/2023 Allergies as of 8/1/2017  Review Complete On: 8/1/2017 By: Jerry Roldan LPN Severity Noted Reaction Type Reactions Penicillins Low 05/23/2016   Not Verified Other (comments) Doesn't remember reaction Current Immunizations  Reviewed on 11/29/2016 Name Date Influenza Vaccine 10/27/2016 Influenza Vaccine (Quad) PF 10/30/2016  9:39 AM  
 Pneumococcal Conjugate (PCV-13) 6/20/2016 Not reviewed this visit You Were Diagnosed With   
  
 Codes Comments Hep C w/o coma, chronic (HCC)    -  Primary ICD-10-CM: B18.2 ICD-9-CM: 070.54 Mixed hyperlipidemia     ICD-10-CM: E78.2 ICD-9-CM: 272.2 Vitals BP Pulse Temp Resp Height(growth percentile) Weight(growth percentile) 131/75 68 95.5 °F (35.3 °C) (Oral) 16 5' 6\" (1.676 m) 168 lb 9.6 oz (76.5 kg) SpO2 BMI Smoking Status 98% 27.21 kg/m2 Current Every Day Smoker BMI and BSA Data Body Mass Index Body Surface Area  
 27.21 kg/m 2 1.89 m 2 Preferred Pharmacy Pharmacy Name Phone [facility] [address] [place] [address] - Desiree Cortez[address] [phone] Your Updated Medication List  
  
   
This list is accurate as of: 8/1/17  9:55 AM.  Always use your most recent med list.  
  
  
  
  
 aspirin delayed-release 81 mg tablet Take 1 Tab by mouth daily. atorvastatin 40 mg tablet Commonly known as:  LIPITOR Take 1 Tab by mouth daily. Blood-Glucose Meter monitoring kit Check every day. Brand per patient's choice  
  
 carvedilol 6.25 mg tablet Commonly known as:  Jude Heal Take 1 Tab by mouth two (2) times daily (with meals). glucose blood VI test strips strip Commonly known as:  ASCENSIA AUTODISC VI, ONE TOUCH ULTRA TEST VI Check every day. Brand per patient's choice * Lancets Misc Check QD  
  
 * ONETOUCH DELICA LANCETS 30 gauge Misc Generic drug:  lancets USE DAILY TO CHECK BLOOD SUGAR  
  
 levothyroxine 88 mcg tablet Commonly known as:  SYNTHROID Take 1 Tab by mouth Daily (before breakfast). lisinopril 2.5 mg tablet Commonly known as:  PRINIVIL, ZESTRIL  
TAKE 1 TAB BY MOUTH DAILY. metFORMIN 1,000 mg tablet Commonly known as:  GLUCOPHAGE Take 1 Tab by mouth two (2) times daily (with meals). omeprazole 20 mg capsule Commonly known as:  PRILOSEC Take 1 Cap by mouth daily. ticagrelor 90 mg tablet Commonly known as:  [employer] Take 1 Tab by mouth every twelve (12) hours every twelve (12) hours. * Notice:   This list has 2 medication(s) that are the same as other medications prescribed for you. Read the directions carefully, and ask your doctor or other care provider to review them with you. We Performed the Following HEPATITIS C QT BY PCR WITH REFLEX GENOTYPE [[id number] Custom] HEPATITIS PANEL, ACUTE [11780 CPT(R)] PROTHROMBIN TIME + INR [30702 CPT(R)] Follow-up Instructions Return in about 3 months (around 11/1/2017) for chronic med management. To-Do List   
 08/01/2017 Imaging:  US ABD LTD [facility] SERVICES! Dear Isael Escobedo: Thank you for requesting a [employer] account. Our records indicate that you already have an active [employer] account. You can access your account anytime at [email or web address] Did you know that you can access your hospital and ER discharge instructions at any time in [employer]? You can also review all of your test results from your hospital stay or ER visit. Additional Information If you have questions, please visit the Frequently Asked Questions section of the [employer] website at [email or web address] Remember, [employer] is NOT to be used for urgent needs. For medical emergencies, dial 911. Now available from your iPhone and Android! Please provide this summary of care documentation to your next provider. Your primary care clinician is listed as Ange Stringer. If you have any questions after today's visit, please call [phone].

## 2017-08-03 LAB
HAV IGM SERPL QL IA: NEGATIVE
HBV CORE IGM SERPL QL IA: NEGATIVE
HBV SURFACE AG SERPL QL IA: NEGATIVE
HCV AB S/CO SERPL IA: >11 S/CO RATIO (ref 0–0.9)
HCV GENOTYPE: NORMAL
HCV RNA SERPL NAA+PROBE-ACNC: NORMAL IU/ML
HCV RNA SERPL NAA+PROBE-LOG IU: NORMAL LOG10 IU/ML
INR PPP: 1 (ref 0.8–1.2)
PROTHROMBIN TIME: 10.5 SEC (ref 9.1–12)
TEST INFORMATION: NORMAL

## 2017-08-09 ENCOUNTER — HOSPITAL ENCOUNTER (OUTPATIENT)
Dept: ULTRASOUND IMAGING | Age: 62
Discharge: HOME | End: 2017-08-09
Attending: FAMILY MEDICINE
Payer: COMMERCIAL

## 2017-08-09 DIAGNOSIS — B18.2 HEP C W/O COMA, CHRONIC (HCC): ICD-10-CM

## 2017-08-09 PROCEDURE — 76705 ECHO EXAM OF ABDOMEN: CPT

## 2017-08-17 DIAGNOSIS — A08.4 VIRAL GASTROENTERITIS: ICD-10-CM

## 2017-08-17 DIAGNOSIS — I20.8 STABLE ANGINA (HCC): ICD-10-CM

## 2017-08-17 DIAGNOSIS — E11.00 UNCONTROLLED TYPE 2 DIABETES MELLITUS WITH HYPEROSMOLARITY WITHOUT COMA, WITHOUT LONG-TERM CURRENT USE OF INSULIN (HCC): ICD-10-CM

## 2017-08-18 RX ORDER — ASPIRIN 81 MG/1
81 TABLET ORAL DAILY
Qty: 90 TAB | Refills: 1 | Status: SHIPPED | OUTPATIENT
Start: 2017-08-18 | End: 2018-03-07 | Stop reason: SDUPTHER

## 2017-08-21 RX ORDER — LISINOPRIL 2.5 MG/1
TABLET ORAL
Qty: 30 TAB | Refills: 2 | Status: SHIPPED | OUTPATIENT
Start: 2017-08-21 | End: 2017-10-23 | Stop reason: SDUPTHER

## 2017-08-21 RX ORDER — METFORMIN HYDROCHLORIDE 1000 MG/1
TABLET ORAL
Qty: 60 TAB | Refills: 2 | Status: SHIPPED | OUTPATIENT
Start: 2017-08-21 | End: 2017-11-11 | Stop reason: SDUPTHER

## 2017-09-11 ENCOUNTER — OFFICE VISIT (OUTPATIENT)
Dept: CARDIOLOGY CLINIC | Age: 62
End: 2017-09-11

## 2017-09-11 DIAGNOSIS — Z95.810 ICD (IMPLANTABLE CARDIOVERTER-DEFIBRILLATOR), SINGLE, IN SITU: Primary | ICD-10-CM

## 2017-09-11 DIAGNOSIS — I25.5 ISCHEMIC CARDIOMYOPATHY: ICD-10-CM

## 2017-09-11 DIAGNOSIS — I50.22 CHRONIC SYSTOLIC CONGESTIVE HEART FAILURE (HCC): ICD-10-CM

## 2017-09-18 ENCOUNTER — TELEPHONE (OUTPATIENT)
Dept: CARDIOLOGY CLINIC | Age: 62
End: 2017-09-18

## 2017-09-18 NOTE — TELEPHONE ENCOUNTER
Received fax from pharmacy stating that insurance wants pt to use Plavix 75 mg instead of Brilinta. Okay to switch? Please advise, thank you! Message  Received: Today       Melanie Cary, ANP  57 Laura Corley LPN       Caller: Unspecified (Yesterday,  1:57 PM)                     He should be on it one more month. If necessary we can change to 75 mg plavix daily. He should have follow up soon as he was to have echo in the spring to follow up on EF. Thanks. Called pt,verified pt with two pt identifiers, told pt that we had received fax from pharmacy stating that his insurance is no longer covering his Brilinta and he needs to switch to Plavix. He advised his insurance did change. He advised that he has 2 weeks of his Brilinta left. Advised after that he will need to switch to Plavix 75 mg tab daily. Verified pharmacy. His next appt is on 11/1/17 with Diane Martell. He verbalized that he understood everything.

## 2017-09-19 RX ORDER — CLOPIDOGREL BISULFATE 75 MG/1
75 TABLET ORAL DAILY
COMMUNITY
End: 2017-09-19 | Stop reason: SDUPTHER

## 2017-09-20 RX ORDER — CLOPIDOGREL BISULFATE 75 MG/1
75 TABLET ORAL DAILY
Qty: 90 TAB | Refills: 1 | Status: SHIPPED | OUTPATIENT
Start: 2017-09-20 | End: 2017-09-28 | Stop reason: SDUPTHER

## 2017-09-29 RX ORDER — CLOPIDOGREL BISULFATE 75 MG/1
75 TABLET ORAL DAILY
Qty: 90 TAB | Refills: 1 | Status: SHIPPED | OUTPATIENT
Start: 2017-09-29 | End: 2018-07-22 | Stop reason: SDUPTHER

## 2017-10-22 DIAGNOSIS — I50.22 CHRONIC SYSTOLIC CONGESTIVE HEART FAILURE (HCC): ICD-10-CM

## 2017-10-23 RX ORDER — ATORVASTATIN CALCIUM 20 MG/1
TABLET, FILM COATED ORAL
Qty: 90 TAB | Refills: 1 | Status: SHIPPED | OUTPATIENT
Start: 2017-10-23 | End: 2018-04-12 | Stop reason: SDUPTHER

## 2017-10-25 ENCOUNTER — TELEPHONE (OUTPATIENT)
Dept: HEMATOLOGY | Age: 62
End: 2017-10-25

## 2017-10-25 RX ORDER — LISINOPRIL 2.5 MG/1
TABLET ORAL
Qty: 90 TAB | Refills: 1 | Status: SHIPPED | OUTPATIENT
Start: 2017-10-25

## 2017-10-25 NOTE — TELEPHONE ENCOUNTER
Spoke to patient regarding new patient appointment. Patient informed to bring insurance card and medications, as well as arrive 15 minutes early. Address and instructions to office given.

## 2017-10-26 ENCOUNTER — TELEPHONE (OUTPATIENT)
Dept: FAMILY MEDICINE CLINIC | Age: 62
End: 2017-10-26

## 2017-10-26 NOTE — TELEPHONE ENCOUNTER
Pt now has insurance [facility]     He needs a referral for visit tomorrow   [facility]       Best number to reach him is [phone]

## 2017-10-27 ENCOUNTER — OFFICE VISIT (OUTPATIENT)
Dept: HEMATOLOGY | Age: 62
End: 2017-10-27

## 2017-10-27 VITALS
HEIGHT: 66 IN | OXYGEN SATURATION: 98 % | TEMPERATURE: 95.5 F | DIASTOLIC BLOOD PRESSURE: 56 MMHG | SYSTOLIC BLOOD PRESSURE: 116 MMHG | WEIGHT: 176.8 LBS | BODY MASS INDEX: 28.42 KG/M2 | HEART RATE: 71 BPM

## 2017-10-27 DIAGNOSIS — R76.8 HCV ANTIBODY POSITIVE: Primary | ICD-10-CM

## 2017-10-27 NOTE — MR AVS SNAPSHOT
Visit Information Date & Time Provider Department Dept. Phone Encounter #  
 10/27/2017  1:30 PM Bassam Ashley 47 [facility] 219 [id number] Your Appointments 11/15/2017 10:45 AM  
6 MONTH with Madelaine Dawson MD  
[facility] [address] [place] [address]) Appt Note: Per Dr. Toni Wheeler cp? NRN; 78-51-41@ 3:33pm; dr off; left vm advis new appt 11-20-17 -lj [address] [facility]  
[phone] [address] [facility]  
  
    
  
 12/11/2017  8:00 AM  
REMOTE OFFICE VISIT with [facility] [address] [place] [address]) Appt Note: NOT AN OFFICE VISIT - REMOTE MDT ICD  
 [address] [facility]  
[phone] [address] [facility] Upcoming Health Maintenance Date Due  
 FOOT EXAM Q1 3/30/1965 EYE EXAM RETINAL OR DILATED Q1 3/30/1963 Pneumococcal 19-64 Medium Risk (1 of 1 - PPSV23) 3/30/1972 FOBT Q 1 YEAR AGE 50-75 3/30/2003 INFLUENZA AGE 9 TO ADULT 8/1/2017 HEMOGLOBIN A1C Q6M 10/28/2017 MICROALBUMIN Q1 6/30/2018 LIPID PANEL Q1 6/30/2018 DTaP/Tdap/Td series (2 - Td) 9/11/2023 Allergies as of 10/27/2017  Review Complete On: 10/27/2017 By: Agapito Deleon Severity Noted Reaction Type Reactions Penicillins Low 05/23/2016   Not Verified Other (comments) Doesn't remember reaction Current Immunizations  Reviewed on 11/29/2016 Name Date Influenza Vaccine 10/27/2016 Influenza Vaccine (Quad) PF 10/30/2016  9:39 AM  
 Pneumococcal Conjugate (PCV-13) 6/20/2016 Not reviewed this visit You Were Diagnosed With   
  
 Codes Comments HCV antibody positive    -  Primary ICD-10-CM: R76.8 ICD-9-CM: 795.79 Vitals BP Pulse Temp Height(growth percentile) Weight(growth percentile) SpO2 116/56 (BP 1 Location: Left arm, BP Patient Position: Sitting) 71 95.5 °F (35.3 °C) (Oral) 5' 6\" (1.676 m) 176 lb 12.8 oz (80.2 kg) 98% BMI Smoking Status 28.54 kg/m2 Current Every Day Smoker BMI and BSA Data Body Mass Index Body Surface Area 28.54 kg/m 2 1.93 m 2 Preferred Pharmacy Pharmacy Name Phone [facility], [address] [place] [address] [address] Your Updated Medication List  
  
   
This list is accurate as of: 10/27/17  2:10 PM.  Always use your most recent med list.  
  
  
  
  
 aspirin delayed-release 81 mg tablet Take 1 Tab by mouth daily. atorvastatin 20 mg tablet Commonly known as:  LIPITOR  
TAKE 1 TABLET BY MOUTH EVERY DAY Blood-Glucose Meter monitoring kit Check every day. Brand per patient's choice  
  
 carvedilol 6.25 mg tablet Commonly known as:  Roselinda Burkitt Take 1 Tab by mouth two (2) times daily (with meals). clopidogrel 75 mg Tab Commonly known as:  PLAVIX Take 1 Tab by mouth daily. glucose blood VI test strips strip Commonly known as:  ASCENSIA AUTODISC VI, ONE TOUCH ULTRA TEST VI Check every day. Brand per patient's choice * Lancets Misc Check QD  
  
 * ONETOUCH DELICA LANCETS 30 gauge Misc Generic drug:  lancets USE DAILY TO CHECK BLOOD SUGAR  
  
 levothyroxine 88 mcg tablet Commonly known as:  SYNTHROID Take 1 Tab by mouth Daily (before breakfast). lisinopril 2.5 mg tablet Commonly known as:  PRINIVIL, ZESTRIL  
TAKE 1 TAB BY MOUTH DAILY. metFORMIN 1,000 mg tablet Commonly known as:  GLUCOPHAGE  
TAKE 1 TABLET BY MOUTH TWICE A DAY WITH MEALS  
  
 omeprazole 20 mg capsule Commonly known as:  PRILOSEC Take 1 Cap by mouth daily. * Notice: This list has 2 medication(s) that are the same as other medications prescribed for you. Read the directions carefully, and ask your doctor or other care provider to review them with you. We Performed the Following HCV RNA BY ERNESTO QL,RFLX TO QT [32947 CPT(R)] Introducing [facility] & HEALTH SERVICES! Dear Imelda Began: Thank you for requesting a [employer] account. Our records indicate that you already have an active [employer] account. You can access your account anytime at [email or web address] Did you know that you can access your hospital and ER discharge instructions at any time in [employer]? You can also review all of your test results from your hospital stay or ER visit. Additional Information If you have questions, please visit the Frequently Asked Questions section of the [employer] website at [email or web address] Remember, [employer] is NOT to be used for urgent needs. For medical emergencies, dial 911. Now available from your iPhone and Android! Please provide this summary of care documentation to your next provider. Your primary care clinician is listed as Jose Dvaid Krishnan. If you have any questions after today's visit, please call [phone].

## 2017-10-27 NOTE — PROGRESS NOTES
[address] Matt Martinez MD, [address], [facility], [place]       Christy Escalante, ALIREZA Julio, [facility]   KELLY Jensen NP        at [address], [address] [facility], [address]     [phone]     FAX: [phone]    at [address], [address] [facility], [address]     [phone]     FAX: [phone]         Patient Care Team:  Jv Thrasher MD as PCP - General (Family Practice)  Hemanth Luis RN as Ambulatory Care Navigator (Cardiology)  Riley Gordon MD as Physician (Cardiology)  Yael Chester MD (Cardiology)      Problem List  Date Reviewed: 8/1/2017          Codes Class Noted    HCV antibody positive ICD-10-CM: R76.8  ICD-9-CM: 795.79  10/27/2017        Smoking ICD-10-CM: F17.200  ICD-9-CM: 305.1  6/30/2017        Heart murmur ICD-10-CM: R01.1  ICD-9-CM: 785.2  6/30/2017        Left inguinal hernia ICD-10-CM: K40.90  ICD-9-CM: 550.90  6/30/2017        Aortic valve stenosis ICD-10-CM: I35.0  ICD-9-CM: 424.1  6/30/2017        ICD (implantable cardioverter-defibrillator) in place ICD-10-CM: Z95.810  ICD-9-CM: V45.02  4/28/2017        PU (peptic ulcer) ICD-10-CM: K27.9  ICD-9-CM: 533.90  4/28/2017        ICD (implantable cardioverter-defibrillator), single, in situ ICD-10-CM: Z95.810  ICD-9-CM: V45.02  2/20/2017    Overview Signed 2/20/2017 10:13 AM by Lucie Kent NP     [employer] 2/20/17             Ischemic cardiomyopathy ICD-10-CM: I25.5  ICD-9-CM: 414.8  11/11/2016        Esophageal ulceration ICD-10-CM: K22.10  ICD-9-CM: 530.20  10/26/2016    Overview Signed 10/26/2016 10:17 AM by Edson Espinoza NP     10/25/16 2 hemoclips placed             GIB (gastrointestinal bleeding) ICD-10-CM: K92.2  ICD-9-CM: 578.9  10/26/2016        Acute kidney injury ([facility] [address] ICD-10-CM: N17.9  ICD-9-CM: 584.9  10/26/2016        S/P coronary artery stent placement ICD-10-CM: Z95.5  ICD-9-CM: V45.82  10/25/2016    Overview Signed 10/25/2016 10:25 AM by Jessi Stover NP     10/24/16 PCI/EDUARDO x6 to prox LCx, [id number] RCA             Unstable angina (HCC) ICD-10-CM: I20.0  ICD-9-CM: 411.1  10/24/2016        Angina, class III (HCC) ICD-10-CM: I20.9  ICD-9-CM: 413.9  10/24/2016        Chronic systolic congestive heart failure (HCC) ICD-10-CM: I50.22  ICD-9-CM: 428.22, 428.0  8/30/2016        GUILLEN (dyspnea on exertion) ICD-10-CM: R06.09  ICD-9-CM: 786.09  8/24/2016        Acquired hypothyroidism ICD-10-CM: E03.9  ICD-9-CM: 244.9  7/11/2016        Mixed hyperlipidemia ICD-10-CM: E78.2  ICD-9-CM: 272.2  7/11/2016        Diabetes type 2, uncontrolled ([address] ICD-10-CM: E11.65  ICD-9-CM: 250.02  7/11/2016        Smoker ICD-10-CM: L02.661  ICD-9-CM: 305.1  5/23/2016        Adhesive capsulitis of right shoulder ICD-10-CM: M75.01  ICD-9-CM: 726.0  5/23/2016        Gastroesophageal reflux disease without esophagitis ICD-10-CM: K21.9  ICD-9-CM: 530.81  5/23/2016                The physicians listed above have asked me to see Horace Tenorio in consultation regarding chronic HCV and its management. All medical records sent by the referring physicians were reviewed     The patient is a 57 y.o.  male who tested positive for chronic HCV in 6/2017. Risk factors for acquiring HCV are IV drug use in 1960s. There was no history of acute incteric hepatitis at the time of these risk factors. Ultrasound of the liver was performed in 8/2017. The results of the imaging demonstrated a normal appearing liver. An assessment of liver fibrosis with biopsy or elastography has not been performed. The patient has never received treatment for chronic HCV.       The most recent laboratory studies indicate that the liver transaminases are normal, alkaline phosphatase is normal, tests of hepatic synthetic and metabolic function are normal, and the platelet count is normal.      The patient has no symptoms which can be attributed to the liver disorder. The patient has not experienced pain in the right side over the liver,     The patient completes all daily activities without any functional limitations. ALLERGIES  Allergies   Allergen Reactions    Penicillins Other (comments)     Doesn't remember reaction       MEDICATIONS  Current Outpatient Prescriptions   Medication Sig    lisinopril (PRINIVIL, ZESTRIL) 2.5 mg tablet TAKE 1 TAB BY MOUTH DAILY.  atorvastatin (LIPITOR) 20 mg tablet TAKE 1 TABLET BY MOUTH EVERY DAY    clopidogrel (PLAVIX) 75 mg tab Take 1 Tab by mouth daily.  metFORMIN (GLUCOPHAGE) 1,000 mg tablet TAKE 1 TABLET BY MOUTH TWICE A DAY WITH MEALS    aspirin delayed-release 81 mg tablet Take 1 Tab by mouth daily.  levothyroxine (SYNTHROID) 88 mcg tablet Take 1 Tab by mouth Daily (before breakfast).  omeprazole (PRILOSEC) 20 mg capsule Take 1 Cap by mouth daily.  carvedilol (COREG) 6.25 mg tablet Take 1 Tab by mouth two (2) times daily (with meals).  ONETOUCH DELICA LANCETS 30 gauge misc USE DAILY TO CHECK BLOOD SUGAR    Blood-Glucose Meter monitoring kit Check every day. Brand per patient's choice    glucose blood VI test strips (ASCENSIA AUTODISC VI, ONE TOUCH ULTRA TEST VI) strip Check every day. Brand per patient's choice    Lancets misc Check QD     No current facility-administered medications for this visit. SYSTEM REVIEW NOT RELATED TO LIVER DISEASE OR REVIEWED ABOVE:  Constitution systems: Negative for fever, chills, weight gain, weight loss. Eyes: Negative for visual changes. ENT: Negative for sore throat, painful swallowing. Respiratory: Negative for cough, hemoptysis, SOB. Cardiology: Negative for chest pain, palpitations. GI:  Negative for constipation or diarrhea. : Negative for urinary frequency, dysuria, hematuria, nocturia. Skin: Negative for rash.   Hematology: Negative for easy bruising, blood clots. Musculo-skelatal: Negative for back pain, muscle pain, weakness. Neurologic: Negative for headaches, dizziness, vertigo, memory problems not related to HE. Psychology: Negative for anxiety, depression. FAMILY HISTORY:  The father is alive and healthy. The mother is alive and healthy. There is no family history of liver disease. SOCIAL HISTORY:  The patient is . The patient has 2 children, and 1 grandchild. The patient currently smokes half pack of tobacco daily. The patient has never consumed significant amounts of alcohol. The patient used to work in an [employer]. PHYSICAL EXAMINATION:  Visit Vitals    /56 (BP 1 Location: Left arm, BP Patient Position: Sitting)    Pulse 71    Temp 95.5 °F (35.3 °C) (Oral)    Ht 5' 6\" (1.676 m)    Wt 176 lb 12.8 oz (80.2 kg)    SpO2 98%    BMI 28.54 kg/m2     General: No acute distress. Eyes: Sclera anicteric. ENT: No oral lesions. Thyroid normal.  Nodes: No adenopathy. Skin: No spider angiomata. No jaundice. No palmar erythema. Respiratory: Lungs clear to auscultation. Cardiovascular: Regular heart rate. No murmurs. No JVD. Abdomen: Soft non-tender. Liver size normal to percussion/palpation. Spleen not palpable. No obvious ascites. Extremities: No edema. No muscle wasting. No gross arthritic changes. Neurologic: Alert and oriented. Cranial nerves grossly intact. No asterixis.     LABORATORY STUDIES:  Liver [place] [address] Units 6/30/2017 2/15/2017   WBC 3.4 - 10.8 x10E3/uL 6.7 4.2   ANC 1.4 - 7.0 x10E3/uL  1.9   HGB 12.6 - 17.7 g/dL 13.3 12.1 (L)    - 379 x10E3/uL 214 209   INR 0.8 - 1.2  1.1   AST 0 - 40 IU/L 14 14   ALT 0 - 44 IU/L 14 13   Alk Phos 39 - 117 IU/L 75 65   Bili, Total 0.0 - 1.2 mg/dL 0.4 0.5   Albumin 3.6 - 4.8 g/dL 4.4 4.1   BUN 8 - 27 mg/dL 9 7 (L)   Creat 0.76 - 1.27 mg/dL 0.83 0.67 (L)   Na 134 - 144 mmol/L 138 141   K 3.5 - 5.2 mmol/L 4.5 3.9   Cl 96 - 106 mmol/L 99 100   CO2 18 - 29 mmol/L 24 27   Glucose 65 - 99 mg/dL 130 (H) 139 (H)   Magnesium 1.6 - 2.3 mg/dL  1.0 (L)     SEROLOGIES:  Serologies Latest Ref Rng & Units 8/1/2017   Hep B Surface Ag Negative Negative   HCV RT-PCR, Quant IU/mL HCV Not Detected   HCV Log10 log10 IU/mL CANCELED     LIVER HISTOLOGY:  Not available or performed    ENDOSCOPIC PROCEDURES:  Not available or performed    RADIOLOGY:  8/2017. Ultrasound of liver. Normal appearing liver. No liver mass lesions. Gallstones. OTHER TESTING:  Not available or performed    ASSESSMENT AND PLAN:  Anti-HCV positive with undetectable HCV RNA. The patient has been exposed to HCV but has had spontaneous resolution. Will repeat HCV RNA a second time to ensure the first negative result was not a false negative. Once there are 2 negative HCV RNA tests no further testing for HCV is required. The liver transaminases are normal.  Liver function is normal.  The platelet count is normal.      Based upon laboratory studies and imaging the patient does not appear to have any liver disease. Have performed laboratory testing to monitor liver function and degree of liver injury. This included BMP, hepatic panel, CBC with platelet count. Will perform and/or review results of HCV viral load and HCV genotype to define the specific treatment and duration of treatment that will be required. Will perform serologic and virologic studies to assess for other causes of chronic liver disease. The patient has not previously been treated for HCV. The patient was directed to continue all current medications at the current dosages. There are no contraindications for the patient to take any medications that are necessary for treatment of other medical issues. The patient was counseled regarding alcohol consumption.       Since the patient does not have chronic liver disease screening for HAV and HBV immunity was not performed. All of the above issues were discussed with the patient. All questions were answered. The patient expressed a clear understanding of the above. No follow-up at [facility] [place] is needed. The patient will return for repeat HCV RNA testing to ensure the first test was not a false negative. Once there are 2 negative HCV RNA tests no further testing or evalaution for HCV is needed.     Jonathan To MD  Liver [place] [address]  [phone]

## 2017-10-29 LAB — HCV RNA SERPL QL NAA+PROBE: NEGATIVE

## 2017-11-12 DIAGNOSIS — E03.9 ACQUIRED HYPOTHYROIDISM: ICD-10-CM

## 2017-11-13 ENCOUNTER — TELEPHONE (OUTPATIENT)
Dept: FAMILY MEDICINE CLINIC | Age: 62
End: 2017-11-13

## 2017-11-13 RX ORDER — LEVOTHYROXINE SODIUM 88 UG/1
TABLET ORAL
Qty: 90 TAB | Refills: 1 | Status: SHIPPED | OUTPATIENT
Start: 2017-11-13 | End: 2018-05-08 | Stop reason: SDUPTHER

## 2017-11-13 RX ORDER — METFORMIN HYDROCHLORIDE 1000 MG/1
TABLET ORAL
Qty: 60 TAB | Refills: 2 | Status: SHIPPED | OUTPATIENT
Start: 2017-11-13 | End: 2018-02-10 | Stop reason: SDUPTHER

## 2017-11-13 NOTE — TELEPHONE ENCOUNTER
Pt wants a referral to [facility]   Dr. Shyanne Kearney   For: 11/15/17  Re:  F/u visit   [phone]  Fax     Best number to reach him is [phone]

## 2017-11-14 ENCOUNTER — DOCUMENTATION ONLY (OUTPATIENT)
Dept: FAMILY MEDICINE CLINIC | Age: 62
End: 2017-11-14

## 2017-11-15 ENCOUNTER — OFFICE VISIT (OUTPATIENT)
Dept: CARDIOLOGY CLINIC | Age: 62
End: 2017-11-15

## 2017-11-15 VITALS
RESPIRATION RATE: 16 BRPM | DIASTOLIC BLOOD PRESSURE: 64 MMHG | HEIGHT: 66 IN | OXYGEN SATURATION: 98 % | SYSTOLIC BLOOD PRESSURE: 122 MMHG | BODY MASS INDEX: 29.06 KG/M2 | HEART RATE: 71 BPM | WEIGHT: 180.8 LBS

## 2017-11-15 DIAGNOSIS — I25.5 ISCHEMIC CARDIOMYOPATHY: ICD-10-CM

## 2017-11-15 DIAGNOSIS — E78.2 MIXED HYPERLIPIDEMIA: ICD-10-CM

## 2017-11-15 DIAGNOSIS — Z95.810 ICD (IMPLANTABLE CARDIOVERTER-DEFIBRILLATOR) IN PLACE: ICD-10-CM

## 2017-11-15 DIAGNOSIS — I25.10 CORONARY ARTERY DISEASE INVOLVING NATIVE CORONARY ARTERY OF NATIVE HEART WITHOUT ANGINA PECTORIS: ICD-10-CM

## 2017-11-15 DIAGNOSIS — I50.22 CHRONIC SYSTOLIC CONGESTIVE HEART FAILURE (HCC): Primary | ICD-10-CM

## 2017-11-15 RX ORDER — TICAGRELOR 90 MG/1
TABLET ORAL
Refills: 11 | COMMUNITY
Start: 2017-08-14 | End: 2018-07-23

## 2017-11-15 NOTE — PROGRESS NOTES
1. Have you been to the ER, urgent care clinic since your last visit? Hospitalized since your last visit? No    2. Have you seen or consulted any other health care providers outside of the [address] [place] [address] since your last visit? Include any pap smears or colon screening.  No    Per pt, stopped taking prilosec per recommendation when last hospitalized  X >1 yr ago    Chief Complaint   Patient presents with    Cholesterol Problem     6 mo f/u    Fatigue

## 2017-11-27 NOTE — PROGRESS NOTES
Debra Snyder MD          NAME:  Jamshid Alonso   :   1955   MRN:   [id number]   PCP:  Sarah Casiano MD           Subjective: The patient is a 57y.o. year old male  who returns for a routine follow-up. Since the last visit, patient reports no change in exercise tolerance, chest pain, edema, medication intolerance, palpitations, shortness of breath, PND/orthopnea wheezing, sputum, syncope, dizziness or light headedness. Doing well. Past Medical History:   Diagnosis Date    Acquired hypothyroidism 2016    Acute kidney injury ([address] 10/26/2016    Adhesive capsulitis of right shoulder 2016    Aortic valve stenosis 2017    Chronic systolic congestive heart failure ([facility] [address] 2016    Chronic systolic congestive heart failure ([facility] [address] 2016    Diabetes type 2, uncontrolled ([facility] [address] 2016    Esophageal ulceration 10/26/2016    10/25/16 2 hemoclips placed    Gastroesophageal reflux disease without esophagitis 2016    GIB (gastrointestinal bleeding) 10/26/2016    Heart murmur 2017    ICD (implantable cardioverter-defibrillator) in place 2017    Left inguinal hernia 2017    Mixed hyperlipidemia 2016    PU (peptic ulcer) 2017    S/P coronary artery stent placement 10/25/2016    10/24/16 PCI/EDUARDO x6 to prox LCx, [id number] RCA    Smoking 2017    Smoking 2017        ICD-10-CM ICD-9-CM    1. Chronic systolic congestive heart failure (HCC) I50.22 428.22      428.0    2. Mixed hyperlipidemia E78.2 272.2 AMB POC EKG ROUTINE W/ 12 LEADS, INTER & REP   3. Ischemic cardiomyopathy I25.5 414.8    4. ICD (implantable cardioverter-defibrillator) in place Z95.810 V45.02    5.  Coronary artery disease involving native coronary artery of native heart without angina pectoris I25.10 414.01       Social History   Substance Use Topics    Smoking status: Current Every Day Smoker     Packs/day: 0.50     Years: 45.00    Smokeless tobacco: Never Used Comment: down to 1/2 pack daily 11/29/16    Alcohol use No      Family History   Problem Relation Age of Onset    Heart Disease Father      cabg x2, valve repair    Hypertension Father     Diabetes Brother     Diabetes Maternal Grandmother         Review of Systems  Cardiovascular: Negative except as noted in HPI      Objective:       Vitals:    11/15/17 1022 11/15/17 1032   BP: 120/82 122/64   Pulse: 71    Resp: 16    SpO2: 98%    Weight: 180 lb 12.8 oz (82 kg)    Height: 5' 6\" (1.676 m)     Body mass index is 29.18 kg/(m^2). General PE  Mental Status - Alert. General Appearance - Not in acute distress. Chest and Lung Exam   Inspection: Accessory muscles - No use of accessory muscles in breathing. Auscultation:   Breath sounds: - Normal.    Cardiovascular   Inspection: Jugular vein - Bilateral - Inspection Normal.  Palpation/Percussion:   Apical Impulse: - Normal.  Auscultation: Rhythm - Regular. Heart Sounds - S1 WNL and S2 WNL. No S3 or S4. Murmurs & Other Heart Sounds: Auscultation of the heart reveals - No Murmurs. Peripheral Vascular   Upper Extremity: Inspection - Bilateral - No Cyanotic nailbeds or Digital clubbing. Lower Extremity:   Palpation: Edema - Bilateral - No edema. Data Review:     EKG -  EKG: Sinus  Rhythm   -Nonspecific QRS widening.    -Left atrial enlargement.    -Nonspecific ST depression   +   Negative T-waves. [employer]- @[employer]@      Allergies reviewed  Allergies   Allergen Reactions    Penicillins Other (comments)     Doesn't remember reaction       Medications reviewed  Current Outpatient Prescriptions   Medication Sig    metFORMIN (GLUCOPHAGE) 1,000 mg tablet TAKE 1 TABLET BY MOUTH TWICE A DAY WITH MEALS    levothyroxine (SYNTHROID) 88 mcg tablet TAKE 1 TAB BY MOUTH DAILY (BEFORE BREAKFAST).  lisinopril (PRINIVIL, ZESTRIL) 2.5 mg tablet TAKE 1 TAB BY MOUTH DAILY.     atorvastatin (LIPITOR) 20 mg tablet TAKE 1 TABLET BY MOUTH EVERY DAY    clopidogrel (PLAVIX) 75 mg tab Take 1 Tab by mouth daily.  aspirin delayed-release 81 mg tablet Take 1 Tab by mouth daily.  carvedilol (COREG) 6.25 mg tablet Take 1 Tab by mouth two (2) times daily (with meals).  ONETOUCH DELICA LANCETS 30 gauge misc USE DAILY TO CHECK BLOOD SUGAR    Blood-Glucose Meter monitoring kit Check every day. Brand per patient's choice    glucose blood VI test strips (ASCENSIA AUTODISC VI, ONE TOUCH ULTRA TEST VI) strip Check every day. Brand per patient's choice    Lancets misc Check QD    BRILINTA 90 mg tablet TAKE 1 TABLET BY MOUTH EVERY 12 HOURS    omeprazole (PRILOSEC) 20 mg capsule Take 1 Cap by mouth daily. No current facility-administered medications for this visit. Assessment:       ICD-10-CM ICD-9-CM    1. Chronic systolic congestive heart failure (HCC) I50.22 428.22      428.0    2. Mixed hyperlipidemia E78.2 272.2 AMB POC EKG ROUTINE W/ 12 LEADS, INTER & REP   3. Ischemic cardiomyopathy I25.5 414.8    4. ICD (implantable cardioverter-defibrillator) in place Z95.810 V45.02    5. Coronary artery disease involving native coronary artery of native heart without angina pectoris I25.10 414.01         Orders Placed This Encounter    AMB POC EKG ROUTINE W/ 12 LEADS, INTER & REP     Order Specific Question:   Reason for Exam:     Answer:   Routine    BRILINTA 90 mg tablet     Sig: TAKE 1 TABLET BY MOUTH EVERY 12 HOURS     Refill:  11       Plan:     CHF comp. No angina. BP at target.      Horace Loomis MD

## 2017-12-11 ENCOUNTER — OFFICE VISIT (OUTPATIENT)
Dept: CARDIOLOGY CLINIC | Age: 62
End: 2017-12-11

## 2017-12-11 DIAGNOSIS — Z95.810 ICD (IMPLANTABLE CARDIOVERTER-DEFIBRILLATOR), SINGLE, IN SITU: Primary | ICD-10-CM

## 2017-12-11 DIAGNOSIS — I50.22 CHRONIC SYSTOLIC CONGESTIVE HEART FAILURE (HCC): ICD-10-CM

## 2017-12-11 DIAGNOSIS — I25.5 ISCHEMIC CARDIOMYOPATHY: ICD-10-CM

## 2018-01-22 ENCOUNTER — TELEPHONE (OUTPATIENT)
Dept: HEMATOLOGY | Age: 63
End: 2018-01-22

## 2018-01-22 DIAGNOSIS — B18.2 HEP C W/O COMA, CHRONIC (HCC): Primary | ICD-10-CM

## 2018-01-22 NOTE — TELEPHONE ENCOUNTER
Spoke to patient about negative HCV RNA lab results. Educated patient on why we ask for a second draw. Patient is also aware that if second draw is negative, he does not have to return for follow up. Patient had no further questions. He is scheduled for a lab appointment on Wednesday 1/25/18. Orysia Goldberg was informed.

## 2018-01-25 LAB — HCV RNA SERPL QL NAA+PROBE: NEGATIVE

## 2018-02-05 ENCOUNTER — TELEPHONE (OUTPATIENT)
Dept: HEMATOLOGY | Age: 63
End: 2018-02-05

## 2018-02-05 NOTE — TELEPHONE ENCOUNTER
Patient has been informed that second HCV RNA is negative. Per Dr. Chanelle Chawla. No need for future follow up. Patient had no further questions.

## 2018-02-11 RX ORDER — METFORMIN HYDROCHLORIDE 1000 MG/1
TABLET ORAL
Qty: 60 TAB | Refills: 2 | Status: SHIPPED | OUTPATIENT
Start: 2018-02-11 | End: 2018-05-11 | Stop reason: SDUPTHER

## 2018-03-07 DIAGNOSIS — E11.00 UNCONTROLLED TYPE 2 DIABETES MELLITUS WITH HYPEROSMOLARITY WITHOUT COMA, WITHOUT LONG-TERM CURRENT USE OF INSULIN (HCC): ICD-10-CM

## 2018-03-07 DIAGNOSIS — A08.4 VIRAL GASTROENTERITIS: ICD-10-CM

## 2018-03-07 DIAGNOSIS — I20.8 STABLE ANGINA (HCC): ICD-10-CM

## 2018-03-09 RX ORDER — ASPIRIN 81 MG/1
TABLET ORAL
Qty: 30 TAB | Refills: 0 | Status: SHIPPED | OUTPATIENT
Start: 2018-03-09 | End: 2018-04-14 | Stop reason: SDUPTHER

## 2018-03-09 RX ORDER — LISINOPRIL 2.5 MG/1
TABLET ORAL
Qty: 30 TAB | Refills: 2 | Status: SHIPPED | OUTPATIENT
Start: 2018-03-09

## 2018-03-12 ENCOUNTER — OFFICE VISIT (OUTPATIENT)
Dept: CARDIOLOGY CLINIC | Age: 63
End: 2018-03-12

## 2018-03-12 DIAGNOSIS — I25.5 ISCHEMIC CARDIOMYOPATHY: ICD-10-CM

## 2018-03-12 DIAGNOSIS — Z95.810 ICD (IMPLANTABLE CARDIOVERTER-DEFIBRILLATOR), SINGLE, IN SITU: Primary | ICD-10-CM

## 2018-03-12 DIAGNOSIS — I50.22 CHRONIC SYSTOLIC CONGESTIVE HEART FAILURE (HCC): ICD-10-CM

## 2018-04-12 DIAGNOSIS — I50.22 CHRONIC SYSTOLIC CONGESTIVE HEART FAILURE (HCC): ICD-10-CM

## 2018-04-12 RX ORDER — ATORVASTATIN CALCIUM 20 MG/1
TABLET, FILM COATED ORAL
Qty: 30 TAB | Refills: 0 | Status: SHIPPED | OUTPATIENT
Start: 2018-04-12 | End: 2018-05-10 | Stop reason: SDUPTHER

## 2018-04-12 NOTE — TELEPHONE ENCOUNTER
Last month refill. No more if no appt with us.      Last seen >8 months ago, and I believe his name came up again and again for refill

## 2018-04-14 DIAGNOSIS — E11.00 UNCONTROLLED TYPE 2 DIABETES MELLITUS WITH HYPEROSMOLARITY WITHOUT COMA, WITHOUT LONG-TERM CURRENT USE OF INSULIN (HCC): ICD-10-CM

## 2018-04-14 DIAGNOSIS — A08.4 VIRAL GASTROENTERITIS: ICD-10-CM

## 2018-04-14 DIAGNOSIS — I20.8 STABLE ANGINA (HCC): ICD-10-CM

## 2018-04-16 RX ORDER — ASPIRIN 81 MG/1
TABLET ORAL
Qty: 30 TAB | Refills: 0 | Status: SHIPPED | OUTPATIENT
Start: 2018-04-16 | End: 2018-05-20 | Stop reason: SDUPTHER

## 2018-05-08 DIAGNOSIS — E03.9 ACQUIRED HYPOTHYROIDISM: ICD-10-CM

## 2018-05-08 RX ORDER — LEVOTHYROXINE SODIUM 88 UG/1
TABLET ORAL
Qty: 30 TAB | Refills: 0 | Status: SHIPPED | OUTPATIENT
Start: 2018-05-08

## 2018-05-08 NOTE — TELEPHONE ENCOUNTER
Pt Last seen >9 months ago  Med refill 1 month until f/u  pls call for F/u appointment    thx    Mari Quinteros MD  4/3/2018

## 2018-05-10 DIAGNOSIS — I50.22 CHRONIC SYSTOLIC CONGESTIVE HEART FAILURE (HCC): ICD-10-CM

## 2018-05-10 RX ORDER — ATORVASTATIN CALCIUM 20 MG/1
TABLET, FILM COATED ORAL
Qty: 30 TAB | Refills: 0 | Status: SHIPPED | OUTPATIENT
Start: 2018-05-10

## 2018-05-10 NOTE — TELEPHONE ENCOUNTER
Pt Last seen >9 months ago  Med refill 1 month until f/u  pls call for F/u appointment    thx    Nacho Dahl MD  4/3/2018

## 2018-05-11 RX ORDER — METFORMIN HYDROCHLORIDE 1000 MG/1
TABLET ORAL
Qty: 60 TAB | Refills: 0 | Status: SHIPPED | OUTPATIENT
Start: 2018-05-11

## 2018-05-11 NOTE — TELEPHONE ENCOUNTER
Pt Last seen >9 months ago  Med refill 1 month until f/u  pls call for F/u appointment    thx    Luke Reyes MD  4/3/2018

## 2018-06-11 ENCOUNTER — CLINICAL SUPPORT (OUTPATIENT)
Dept: CARDIOLOGY CLINIC | Age: 63
End: 2018-06-11

## 2018-06-11 DIAGNOSIS — Z95.810 ICD (IMPLANTABLE CARDIOVERTER-DEFIBRILLATOR), SINGLE, IN SITU: Primary | ICD-10-CM

## 2018-06-11 DIAGNOSIS — I50.22 CHRONIC SYSTOLIC CONGESTIVE HEART FAILURE (HCC): ICD-10-CM

## 2018-06-11 DIAGNOSIS — I25.5 ISCHEMIC CARDIOMYOPATHY: ICD-10-CM

## 2018-07-23 RX ORDER — CLOPIDOGREL BISULFATE 75 MG/1
TABLET ORAL
Qty: 90 TAB | Refills: 1 | Status: SHIPPED | OUTPATIENT
Start: 2018-07-23 | End: 2019-01-17 | Stop reason: SDUPTHER

## 2019-01-22 ENCOUNTER — TELEPHONE (OUTPATIENT)
Dept: CARDIOLOGY CLINIC | Age: 64
End: 2019-01-22

## 2019-01-23 NOTE — TELEPHONE ENCOUNTER
Verified patient with 2 patient identifiers  Returned call to patient in reference to medication refill. Hasn't seen Dr Ashlyn Cornejo. Pt states moved to [facility] and is seeing a cardiologist there. Pt will call new cardiologist for medication  refill.

## 2019-01-25 RX ORDER — CLOPIDOGREL BISULFATE 75 MG/1
TABLET ORAL
Qty: 90 TAB | Refills: 1 | Status: SHIPPED | OUTPATIENT
Start: 2019-01-25